# Patient Record
Sex: MALE | Race: WHITE | NOT HISPANIC OR LATINO | Employment: OTHER | URBAN - METROPOLITAN AREA
[De-identification: names, ages, dates, MRNs, and addresses within clinical notes are randomized per-mention and may not be internally consistent; named-entity substitution may affect disease eponyms.]

---

## 2023-02-10 ENCOUNTER — APPOINTMENT (OUTPATIENT)
Dept: RADIOLOGY | Facility: MEDICAL CENTER | Age: 68
DRG: 309 | End: 2023-02-10
Attending: EMERGENCY MEDICINE
Payer: MEDICARE

## 2023-02-10 ENCOUNTER — HOSPITAL ENCOUNTER (INPATIENT)
Facility: MEDICAL CENTER | Age: 68
LOS: 1 days | DRG: 309 | End: 2023-02-11
Attending: EMERGENCY MEDICINE | Admitting: STUDENT IN AN ORGANIZED HEALTH CARE EDUCATION/TRAINING PROGRAM
Payer: MEDICARE

## 2023-02-10 ENCOUNTER — APPOINTMENT (OUTPATIENT)
Dept: RADIOLOGY | Facility: MEDICAL CENTER | Age: 68
DRG: 309 | End: 2023-02-10
Payer: MEDICARE

## 2023-02-10 DIAGNOSIS — I48.91 ATRIAL FIBRILLATION, NEW ONSET (HCC): ICD-10-CM

## 2023-02-10 LAB
ALBUMIN SERPL BCP-MCNC: 4.4 G/DL (ref 3.2–4.9)
ALBUMIN/GLOB SERPL: 1.4 G/DL
ALP SERPL-CCNC: 93 U/L (ref 30–99)
ALT SERPL-CCNC: 26 U/L (ref 2–50)
ANION GAP SERPL CALC-SCNC: 14 MMOL/L (ref 7–16)
AST SERPL-CCNC: 18 U/L (ref 12–45)
BASOPHILS # BLD AUTO: 0.6 % (ref 0–1.8)
BASOPHILS # BLD: 0.07 K/UL (ref 0–0.12)
BILIRUB SERPL-MCNC: 0.5 MG/DL (ref 0.1–1.5)
BUN SERPL-MCNC: 29 MG/DL (ref 8–22)
CALCIUM ALBUM COR SERPL-MCNC: 9.3 MG/DL (ref 8.5–10.5)
CALCIUM SERPL-MCNC: 9.6 MG/DL (ref 8.5–10.5)
CHLORIDE SERPL-SCNC: 102 MMOL/L (ref 96–112)
CO2 SERPL-SCNC: 20 MMOL/L (ref 20–33)
CREAT SERPL-MCNC: 1.59 MG/DL (ref 0.5–1.4)
EKG IMPRESSION: NORMAL
EOSINOPHIL # BLD AUTO: 0.14 K/UL (ref 0–0.51)
EOSINOPHIL NFR BLD: 1.3 % (ref 0–6.9)
ERYTHROCYTE [DISTWIDTH] IN BLOOD BY AUTOMATED COUNT: 41.2 FL (ref 35.9–50)
GFR SERPLBLD CREATININE-BSD FMLA CKD-EPI: 47 ML/MIN/1.73 M 2
GLOBULIN SER CALC-MCNC: 3.1 G/DL (ref 1.9–3.5)
GLUCOSE BLD STRIP.AUTO-MCNC: 219 MG/DL (ref 65–99)
GLUCOSE SERPL-MCNC: 265 MG/DL (ref 65–99)
HCT VFR BLD AUTO: 47.4 % (ref 42–52)
HGB BLD-MCNC: 16.8 G/DL (ref 14–18)
IMM GRANULOCYTES # BLD AUTO: 0.06 K/UL (ref 0–0.11)
IMM GRANULOCYTES NFR BLD AUTO: 0.5 % (ref 0–0.9)
LYMPHOCYTES # BLD AUTO: 2.49 K/UL (ref 1–4.8)
LYMPHOCYTES NFR BLD: 22.4 % (ref 22–41)
MCH RBC QN AUTO: 31.5 PG (ref 27–33)
MCHC RBC AUTO-ENTMCNC: 35.4 G/DL (ref 33.7–35.3)
MCV RBC AUTO: 88.8 FL (ref 81.4–97.8)
MONOCYTES # BLD AUTO: 1.17 K/UL (ref 0–0.85)
MONOCYTES NFR BLD AUTO: 10.5 % (ref 0–13.4)
NEUTROPHILS # BLD AUTO: 7.17 K/UL (ref 1.82–7.42)
NEUTROPHILS NFR BLD: 64.7 % (ref 44–72)
NRBC # BLD AUTO: 0 K/UL
NRBC BLD-RTO: 0 /100 WBC
NT-PROBNP SERPL IA-MCNC: 1493 PG/ML (ref 0–125)
PLATELET # BLD AUTO: 296 K/UL (ref 164–446)
PMV BLD AUTO: 11.8 FL (ref 9–12.9)
POTASSIUM SERPL-SCNC: 3.7 MMOL/L (ref 3.6–5.5)
PROT SERPL-MCNC: 7.5 G/DL (ref 6–8.2)
RBC # BLD AUTO: 5.34 M/UL (ref 4.7–6.1)
SODIUM SERPL-SCNC: 136 MMOL/L (ref 135–145)
TROPONIN T SERPL-MCNC: 26 NG/L (ref 6–19)
TROPONIN T SERPL-MCNC: 26 NG/L (ref 6–19)
TSH SERPL DL<=0.005 MIU/L-ACNC: 1.24 UIU/ML (ref 0.38–5.33)
WBC # BLD AUTO: 11.1 K/UL (ref 4.8–10.8)

## 2023-02-10 PROCEDURE — 36415 COLL VENOUS BLD VENIPUNCTURE: CPT

## 2023-02-10 PROCEDURE — 84484 ASSAY OF TROPONIN QUANT: CPT | Mod: 91

## 2023-02-10 PROCEDURE — 93005 ELECTROCARDIOGRAM TRACING: CPT | Performed by: EMERGENCY MEDICINE

## 2023-02-10 PROCEDURE — 82962 GLUCOSE BLOOD TEST: CPT

## 2023-02-10 PROCEDURE — 700102 HCHG RX REV CODE 250 W/ 637 OVERRIDE(OP): Performed by: EMERGENCY MEDICINE

## 2023-02-10 PROCEDURE — 99223 1ST HOSP IP/OBS HIGH 75: CPT | Mod: AI | Performed by: STUDENT IN AN ORGANIZED HEALTH CARE EDUCATION/TRAINING PROGRAM

## 2023-02-10 PROCEDURE — 92960 CARDIOVERSION ELECTRIC EXT: CPT

## 2023-02-10 PROCEDURE — 700111 HCHG RX REV CODE 636 W/ 250 OVERRIDE (IP): Performed by: EMERGENCY MEDICINE

## 2023-02-10 PROCEDURE — 93005 ELECTROCARDIOGRAM TRACING: CPT

## 2023-02-10 PROCEDURE — A9270 NON-COVERED ITEM OR SERVICE: HCPCS | Performed by: EMERGENCY MEDICINE

## 2023-02-10 PROCEDURE — 84443 ASSAY THYROID STIM HORMONE: CPT

## 2023-02-10 PROCEDURE — 83036 HEMOGLOBIN GLYCOSYLATED A1C: CPT

## 2023-02-10 PROCEDURE — 71045 X-RAY EXAM CHEST 1 VIEW: CPT

## 2023-02-10 PROCEDURE — 85025 COMPLETE CBC W/AUTO DIFF WBC: CPT

## 2023-02-10 PROCEDURE — 5A2204Z RESTORATION OF CARDIAC RHYTHM, SINGLE: ICD-10-PCS | Performed by: EMERGENCY MEDICINE

## 2023-02-10 PROCEDURE — 80053 COMPREHEN METABOLIC PANEL: CPT

## 2023-02-10 PROCEDURE — 99152 MOD SED SAME PHYS/QHP 5/>YRS: CPT

## 2023-02-10 PROCEDURE — 700102 HCHG RX REV CODE 250 W/ 637 OVERRIDE(OP): Performed by: STUDENT IN AN ORGANIZED HEALTH CARE EDUCATION/TRAINING PROGRAM

## 2023-02-10 PROCEDURE — 770020 HCHG ROOM/CARE - TELE (206)

## 2023-02-10 PROCEDURE — 83880 ASSAY OF NATRIURETIC PEPTIDE: CPT

## 2023-02-10 PROCEDURE — A9270 NON-COVERED ITEM OR SERVICE: HCPCS | Performed by: STUDENT IN AN ORGANIZED HEALTH CARE EDUCATION/TRAINING PROGRAM

## 2023-02-10 PROCEDURE — 99285 EMERGENCY DEPT VISIT HI MDM: CPT

## 2023-02-10 RX ORDER — METOPROLOL TARTRATE 1 MG/ML
5 INJECTION, SOLUTION INTRAVENOUS
Status: DISCONTINUED | OUTPATIENT
Start: 2023-02-10 | End: 2023-02-11 | Stop reason: HOSPADM

## 2023-02-10 RX ORDER — LABETALOL HYDROCHLORIDE 5 MG/ML
10 INJECTION, SOLUTION INTRAVENOUS EVERY 4 HOURS PRN
Status: DISCONTINUED | OUTPATIENT
Start: 2023-02-10 | End: 2023-02-11 | Stop reason: HOSPADM

## 2023-02-10 RX ORDER — ACETAMINOPHEN 325 MG/1
650 TABLET ORAL EVERY 6 HOURS PRN
Status: DISCONTINUED | OUTPATIENT
Start: 2023-02-10 | End: 2023-02-11 | Stop reason: HOSPADM

## 2023-02-10 RX ORDER — BISACODYL 10 MG
10 SUPPOSITORY, RECTAL RECTAL
Status: DISCONTINUED | OUTPATIENT
Start: 2023-02-10 | End: 2023-02-11 | Stop reason: HOSPADM

## 2023-02-10 RX ORDER — ETOMIDATE 2 MG/ML
4 INJECTION INTRAVENOUS ONCE
Status: COMPLETED | OUTPATIENT
Start: 2023-02-10 | End: 2023-02-10

## 2023-02-10 RX ORDER — ETOMIDATE 2 MG/ML
0.15 INJECTION INTRAVENOUS ONCE
Status: COMPLETED | OUTPATIENT
Start: 2023-02-10 | End: 2023-02-10

## 2023-02-10 RX ORDER — POLYETHYLENE GLYCOL 3350 17 G/17G
1 POWDER, FOR SOLUTION ORAL
Status: DISCONTINUED | OUTPATIENT
Start: 2023-02-10 | End: 2023-02-11 | Stop reason: HOSPADM

## 2023-02-10 RX ORDER — ASPIRIN 81 MG/1
81 TABLET, CHEWABLE ORAL DAILY
Status: DISCONTINUED | OUTPATIENT
Start: 2023-02-11 | End: 2023-02-11 | Stop reason: HOSPADM

## 2023-02-10 RX ORDER — SIMVASTATIN 40 MG
40 TABLET ORAL EVERY EVENING
Status: DISCONTINUED | OUTPATIENT
Start: 2023-02-10 | End: 2023-02-11 | Stop reason: HOSPADM

## 2023-02-10 RX ORDER — AMIODARONE HYDROCHLORIDE 200 MG/1
400 TABLET ORAL TWICE DAILY
Status: DISCONTINUED | OUTPATIENT
Start: 2023-02-10 | End: 2023-02-11 | Stop reason: HOSPADM

## 2023-02-10 RX ORDER — AMOXICILLIN 250 MG
2 CAPSULE ORAL 2 TIMES DAILY
Status: DISCONTINUED | OUTPATIENT
Start: 2023-02-10 | End: 2023-02-11 | Stop reason: HOSPADM

## 2023-02-10 RX ADMIN — INSULIN HUMAN 2 UNITS: 100 INJECTION, SOLUTION PARENTERAL at 21:25

## 2023-02-10 RX ADMIN — ETOMIDATE 4 MG: 2 INJECTION INTRAVENOUS at 18:48

## 2023-02-10 RX ADMIN — AMIODARONE HYDROCHLORIDE 400 MG: 200 TABLET ORAL at 21:20

## 2023-02-10 RX ADMIN — APIXABAN 5 MG: 5 TABLET, FILM COATED ORAL at 21:20

## 2023-02-10 RX ADMIN — SIMVASTATIN 40 MG: 40 TABLET, FILM COATED ORAL at 21:20

## 2023-02-10 RX ADMIN — ETOMIDATE 13 MG: 2 INJECTION INTRAVENOUS at 18:45

## 2023-02-10 ASSESSMENT — CHA2DS2 SCORE
VASCULAR DISEASE: YES
SEX: MALE
HYPERTENSION: NO
PRIOR STROKE OR TIA OR THROMBOEMBOLISM: NO
CHA2DS2 VASC SCORE: 2
CHF OR LEFT VENTRICULAR DYSFUNCTION: NO
AGE 75 OR GREATER: NO
DIABETES: NO
AGE 65 TO 74: YES

## 2023-02-10 ASSESSMENT — LIFESTYLE VARIABLES
TOTAL SCORE: 0
EVER HAD A DRINK FIRST THING IN THE MORNING TO STEADY YOUR NERVES TO GET RID OF A HANGOVER: NO
HOW MANY TIMES IN THE PAST YEAR HAVE YOU HAD 5 OR MORE DRINKS IN A DAY: 0
DOES PATIENT WANT TO STOP DRINKING: NO
TOTAL SCORE: 0
AVERAGE NUMBER OF DAYS PER WEEK YOU HAVE A DRINK CONTAINING ALCOHOL: 0
ALCOHOL_USE: NO
HAVE PEOPLE ANNOYED YOU BY CRITICIZING YOUR DRINKING: NO
EVER FELT BAD OR GUILTY ABOUT YOUR DRINKING: NO
CONSUMPTION TOTAL: NEGATIVE
HAVE YOU EVER FELT YOU SHOULD CUT DOWN ON YOUR DRINKING: NO
TOTAL SCORE: 0
ON A TYPICAL DAY WHEN YOU DRINK ALCOHOL HOW MANY DRINKS DO YOU HAVE: 0

## 2023-02-10 ASSESSMENT — ENCOUNTER SYMPTOMS
PALPITATIONS: 1
SORE THROAT: 0
SHORTNESS OF BREATH: 0
COUGH: 0
NAUSEA: 0
VOMITING: 0
ABDOMINAL PAIN: 0
FEVER: 0
CHILLS: 0
HEADACHES: 0
DIARRHEA: 0
DIZZINESS: 1

## 2023-02-10 ASSESSMENT — COGNITIVE AND FUNCTIONAL STATUS - GENERAL
DAILY ACTIVITIY SCORE: 24
SUGGESTED CMS G CODE MODIFIER MOBILITY: CH
SUGGESTED CMS G CODE MODIFIER DAILY ACTIVITY: CH
MOBILITY SCORE: 24

## 2023-02-10 ASSESSMENT — PATIENT HEALTH QUESTIONNAIRE - PHQ9
SUM OF ALL RESPONSES TO PHQ9 QUESTIONS 1 AND 2: 0
2. FEELING DOWN, DEPRESSED, IRRITABLE, OR HOPELESS: NOT AT ALL
1. LITTLE INTEREST OR PLEASURE IN DOING THINGS: NOT AT ALL

## 2023-02-10 ASSESSMENT — FIBROSIS 4 INDEX: FIB4 SCORE: 0.81

## 2023-02-10 ASSESSMENT — PAIN DESCRIPTION - PAIN TYPE: TYPE: ACUTE PAIN

## 2023-02-10 NOTE — ED TRIAGE NOTES
Vitals:    02/10/23 1437   BP: 118/70   Pulse: (!) 57   Resp: 16   Temp: 36.7 °C (98.1 °F)   SpO2: 96%     Chief Complaint   Patient presents with    Chest Pain    Dizziness     Pt reports the above for several days. He has had a heart attack about 10 years ago with stent placement.     Pt is ambulatory to and from triage and is alert and oriented x 4.

## 2023-02-11 ENCOUNTER — TELEPHONE (OUTPATIENT)
Dept: CARDIOLOGY | Facility: MEDICAL CENTER | Age: 68
End: 2023-02-11

## 2023-02-11 ENCOUNTER — APPOINTMENT (OUTPATIENT)
Dept: CARDIOLOGY | Facility: MEDICAL CENTER | Age: 68
DRG: 309 | End: 2023-02-11
Attending: STUDENT IN AN ORGANIZED HEALTH CARE EDUCATION/TRAINING PROGRAM
Payer: MEDICARE

## 2023-02-11 ENCOUNTER — PHARMACY VISIT (OUTPATIENT)
Dept: PHARMACY | Facility: MEDICAL CENTER | Age: 68
End: 2023-02-11
Payer: COMMERCIAL

## 2023-02-11 VITALS
HEART RATE: 71 BPM | OXYGEN SATURATION: 94 % | RESPIRATION RATE: 17 BRPM | BODY MASS INDEX: 26.91 KG/M2 | TEMPERATURE: 98.1 F | HEIGHT: 71 IN | DIASTOLIC BLOOD PRESSURE: 84 MMHG | WEIGHT: 192.24 LBS | SYSTOLIC BLOOD PRESSURE: 125 MMHG

## 2023-02-11 DIAGNOSIS — I48.91 ATRIAL FIBRILLATION, NEW ONSET (HCC): ICD-10-CM

## 2023-02-11 DIAGNOSIS — I42.9 CARDIOMYOPATHY, UNSPECIFIED TYPE (HCC): ICD-10-CM

## 2023-02-11 DIAGNOSIS — I25.10 CORONARY ARTERY DISEASE INVOLVING NATIVE CORONARY ARTERY OF NATIVE HEART WITHOUT ANGINA PECTORIS: ICD-10-CM

## 2023-02-11 PROBLEM — N17.9 AKI (ACUTE KIDNEY INJURY) (HCC): Status: RESOLVED | Noted: 2023-02-11 | Resolved: 2023-02-11

## 2023-02-11 PROBLEM — N17.9 AKI (ACUTE KIDNEY INJURY) (HCC): Status: ACTIVE | Noted: 2023-02-11

## 2023-02-11 PROBLEM — I10 PRIMARY HYPERTENSION: Status: ACTIVE | Noted: 2023-02-11

## 2023-02-11 PROBLEM — E11.65 UNCONTROLLED TYPE 2 DIABETES MELLITUS WITH HYPERGLYCEMIA (HCC): Status: ACTIVE | Noted: 2023-02-11

## 2023-02-11 PROBLEM — E78.5 DYSLIPIDEMIA: Status: ACTIVE | Noted: 2023-02-11

## 2023-02-11 LAB
ANION GAP SERPL CALC-SCNC: 12 MMOL/L (ref 7–16)
APPEARANCE UR: CLEAR
BILIRUB UR QL STRIP.AUTO: NEGATIVE
BUN SERPL-MCNC: 30 MG/DL (ref 8–22)
CALCIUM SERPL-MCNC: 8.9 MG/DL (ref 8.5–10.5)
CHLORIDE SERPL-SCNC: 100 MMOL/L (ref 96–112)
CO2 SERPL-SCNC: 23 MMOL/L (ref 20–33)
COLOR UR: YELLOW
CREAT SERPL-MCNC: 1.28 MG/DL (ref 0.5–1.4)
GFR SERPLBLD CREATININE-BSD FMLA CKD-EPI: 61 ML/MIN/1.73 M 2
GLUCOSE BLD STRIP.AUTO-MCNC: 198 MG/DL (ref 65–99)
GLUCOSE BLD STRIP.AUTO-MCNC: 291 MG/DL (ref 65–99)
GLUCOSE SERPL-MCNC: 211 MG/DL (ref 65–99)
GLUCOSE UR STRIP.AUTO-MCNC: 250 MG/DL
KETONES UR STRIP.AUTO-MCNC: ABNORMAL MG/DL
LEUKOCYTE ESTERASE UR QL STRIP.AUTO: NEGATIVE
LV EJECT FRACT  99904: 50
LV EJECT FRACT MOD 4C 99902: 50.58
MICRO URNS: ABNORMAL
NITRITE UR QL STRIP.AUTO: NEGATIVE
PH UR STRIP.AUTO: 5 [PH] (ref 5–8)
POTASSIUM SERPL-SCNC: 3.4 MMOL/L (ref 3.6–5.5)
PROT UR QL STRIP: NEGATIVE MG/DL
RBC UR QL AUTO: NEGATIVE
SODIUM SERPL-SCNC: 135 MMOL/L (ref 135–145)
SP GR UR STRIP.AUTO: 1.03
UROBILINOGEN UR STRIP.AUTO-MCNC: 1 MG/DL

## 2023-02-11 PROCEDURE — 700102 HCHG RX REV CODE 250 W/ 637 OVERRIDE(OP): Performed by: EMERGENCY MEDICINE

## 2023-02-11 PROCEDURE — 99239 HOSP IP/OBS DSCHRG MGMT >30: CPT | Performed by: STUDENT IN AN ORGANIZED HEALTH CARE EDUCATION/TRAINING PROGRAM

## 2023-02-11 PROCEDURE — 82962 GLUCOSE BLOOD TEST: CPT | Mod: 91

## 2023-02-11 PROCEDURE — 93306 TTE W/DOPPLER COMPLETE: CPT | Mod: 26 | Performed by: INTERNAL MEDICINE

## 2023-02-11 PROCEDURE — A9270 NON-COVERED ITEM OR SERVICE: HCPCS | Performed by: EMERGENCY MEDICINE

## 2023-02-11 PROCEDURE — A9270 NON-COVERED ITEM OR SERVICE: HCPCS | Performed by: STUDENT IN AN ORGANIZED HEALTH CARE EDUCATION/TRAINING PROGRAM

## 2023-02-11 PROCEDURE — 700102 HCHG RX REV CODE 250 W/ 637 OVERRIDE(OP): Performed by: STUDENT IN AN ORGANIZED HEALTH CARE EDUCATION/TRAINING PROGRAM

## 2023-02-11 PROCEDURE — 93306 TTE W/DOPPLER COMPLETE: CPT

## 2023-02-11 PROCEDURE — RXMED WILLOW AMBULATORY MEDICATION CHARGE: Performed by: STUDENT IN AN ORGANIZED HEALTH CARE EDUCATION/TRAINING PROGRAM

## 2023-02-11 PROCEDURE — 81003 URINALYSIS AUTO W/O SCOPE: CPT

## 2023-02-11 PROCEDURE — 80048 BASIC METABOLIC PNL TOTAL CA: CPT

## 2023-02-11 PROCEDURE — 99222 1ST HOSP IP/OBS MODERATE 55: CPT | Performed by: INTERNAL MEDICINE

## 2023-02-11 RX ORDER — CARVEDILOL 3.12 MG/1
3.12 TABLET ORAL 2 TIMES DAILY
COMMUNITY
Start: 2023-01-17

## 2023-02-11 RX ORDER — POTASSIUM CHLORIDE 20 MEQ/1
20 TABLET, EXTENDED RELEASE ORAL ONCE
Status: COMPLETED | OUTPATIENT
Start: 2023-02-11 | End: 2023-02-11

## 2023-02-11 RX ORDER — HYDROCHLOROTHIAZIDE 25 MG/1
25 TABLET ORAL DAILY
Status: ON HOLD | COMMUNITY
Start: 2023-01-17 | End: 2023-03-31

## 2023-02-11 RX ORDER — LOSARTAN POTASSIUM 50 MG/1
100 TABLET ORAL
Status: DISCONTINUED | OUTPATIENT
Start: 2023-02-11 | End: 2023-02-11 | Stop reason: HOSPADM

## 2023-02-11 RX ORDER — AMIODARONE HYDROCHLORIDE 400 MG/1
400 TABLET ORAL 2 TIMES DAILY
Qty: 60 TABLET | Refills: 0 | Status: SHIPPED | OUTPATIENT
Start: 2023-02-11 | End: 2023-02-11 | Stop reason: SDUPTHER

## 2023-02-11 RX ORDER — CARVEDILOL 3.12 MG/1
3.12 TABLET ORAL 2 TIMES DAILY WITH MEALS
Status: DISCONTINUED | OUTPATIENT
Start: 2023-02-11 | End: 2023-02-11 | Stop reason: HOSPADM

## 2023-02-11 RX ORDER — SIMVASTATIN 80 MG
40 TABLET ORAL DAILY
Status: ON HOLD | COMMUNITY
Start: 2023-01-17 | End: 2023-03-31

## 2023-02-11 RX ORDER — LOSARTAN POTASSIUM 100 MG/1
100 TABLET ORAL DAILY
COMMUNITY

## 2023-02-11 RX ORDER — AMIODARONE HYDROCHLORIDE 200 MG/1
400 TABLET ORAL 2 TIMES DAILY
Qty: 120 TABLET | Refills: 0 | Status: ON HOLD | OUTPATIENT
Start: 2023-02-11 | End: 2023-03-28 | Stop reason: SDUPTHER

## 2023-02-11 RX ADMIN — POTASSIUM CHLORIDE 20 MEQ: 1500 TABLET, EXTENDED RELEASE ORAL at 08:32

## 2023-02-11 RX ADMIN — LOSARTAN POTASSIUM 100 MG: 50 TABLET, FILM COATED ORAL at 05:19

## 2023-02-11 RX ADMIN — CARVEDILOL 3.12 MG: 3.12 TABLET, FILM COATED ORAL at 08:03

## 2023-02-11 RX ADMIN — ASPIRIN 81 MG: 81 TABLET, CHEWABLE ORAL at 05:19

## 2023-02-11 RX ADMIN — AMIODARONE HYDROCHLORIDE 400 MG: 200 TABLET ORAL at 05:19

## 2023-02-11 RX ADMIN — APIXABAN 5 MG: 5 TABLET, FILM COATED ORAL at 05:19

## 2023-02-11 RX ADMIN — INSULIN HUMAN 1 UNITS: 100 INJECTION, SOLUTION PARENTERAL at 08:06

## 2023-02-11 RX ADMIN — INSULIN HUMAN 3 UNITS: 100 INJECTION, SOLUTION PARENTERAL at 11:39

## 2023-02-11 ASSESSMENT — PAIN DESCRIPTION - PAIN TYPE: TYPE: ACUTE PAIN

## 2023-02-11 NOTE — ED NOTES
Pt to yellow 63.  Pt reports increase in chest pain and reports dizziness.  Pt palced on monitor and -160 a fib.  Ordering another EKG.   ERP to see.

## 2023-02-11 NOTE — ASSESSMENT & PLAN NOTE
Patient says meds were stopped, however per care everywhere saxagliptin removal 12/2022.  A1c 09/2022 was 6.9  In the ED glucose levels 265.  Will need to be on medication, check A1c  Sliding scale insulin while in the hospital

## 2023-02-11 NOTE — ED NOTES
1844: Time out completed. ERP at bedside for cardioversion. RT and 2 RN at bedside.   1845: Medication given per MAR  1846: Synchronized cardioversion 100 J  1848: Pt back into rapid Afib 160's, medicated per MAR.   1850: Synchronized cardioversion 100 J  1902: Pt awake and alert. Sinus rhythm 80's, having short bursts of Afib. Rate controlled.

## 2023-02-11 NOTE — CONSULTS
Reason for Consult:  Asked by Dr Axel Bautista and Lalo Tuttle D.O. to see this patient with atrial fibrillation  Patient's PCP: Jalil Haines M.D.    CC:   Chief Complaint   Patient presents with    Chest Pain    Dizziness       HPI: This is a 68-year-old gentleman with a history of coronary disease status post stenting to his LAD in 2010 he follows up with Dr. Sinha at [facility] he presents with chest pains dizziness and was found to have A-fib with RVR atrial flutter initially he underwent cardioversion which was eventually successful and restart him on anticoagulation and antiarrhythmic with amiodarone.  He has diet-controlled diabetes and has been compliant with his regular medicines and regular follow-up.  He does not recall having similar symptoms.  Overnight he has not had any further atrial fibrillation we did an echocardiogram given his elevated BNP and indeterminate troponin and his EF was 50% with no distinct wall motion abnormalities although the study is technically limited    Medications / Drug list prior to admission:  No current facility-administered medications on file prior to encounter.     Current Outpatient Medications on File Prior to Encounter   Medication Sig Dispense Refill    Simvastatin (ZOCOR PO) Take 1 Tablet by mouth every day.      aspirin EC (ECOTRIN) 81 MG Tablet Delayed Response Take 81 mg by mouth every day.         Current list of administered Medications:    Current Facility-Administered Medications:     losartan (COZAAR) tablet 100 mg, 100 mg, Oral, Q DAY, Paola Atkins M.D., 100 mg at 02/11/23 0519    carvedilol (COREG) tablet 3.125 mg, 3.125 mg, Oral, BID WITH MEALS, Paola Atkins M.D., 3.125 mg at 02/11/23 0803    senna-docusate (PERICOLACE or SENOKOT S) 8.6-50 MG per tablet 2 Tablet, 2 Tablet, Oral, BID **AND** polyethylene glycol/lytes (MIRALAX) PACKET 1 Packet, 1 Packet, Oral, QDAY PRN **AND** magnesium hydroxide (MILK OF MAGNESIA) suspension 30 mL, 30  mL, Oral, QDAY PRN **AND** bisacodyl (DULCOLAX) suppository 10 mg, 10 mg, Rectal, QDAY PRN, Paola Atkins M.D.    apixaban (ELIQUIS) tablet 5 mg, 5 mg, Oral, BID, Paola Atkins M.D., 5 mg at 02/11/23 0519    acetaminophen (Tylenol) tablet 650 mg, 650 mg, Oral, Q6HRS PRN, Paola Atkins M.D.    labetalol (NORMODYNE/TRANDATE) injection 10 mg, 10 mg, Intravenous, Q4HRS PRN, Paola Atkins M.D.    Metoprolol Tartrate (LOPRESSOR) injection 5 mg, 5 mg, Intravenous, Q5 MIN PRN, Paola Atkins M.D.    insulin regular (HumuLIN R,NovoLIN R) injection, 1-6 Units, Subcutaneous, 4X/DAY ACHS, 3 Units at 02/11/23 1139 **AND** POC blood glucose manual result, , , Q AC AND BEDTIME(S) **AND** NOTIFY MD and PharmD, , , Once **AND** Administer 20 grams of glucose (approximately 8 ounces of fruit juice) every 15 minutes PRN FSBG less than 70 mg/dL, , , PRN **AND** dextrose 10 % BOLUS 25 g, 25 g, Intravenous, Q15 MIN PRN, Paola Atkins M.D.    aspirin (ASA) chewable tab 81 mg, 81 mg, Oral, DAILY, Paola Atkins M.D., 81 mg at 02/11/23 0519    simvastatin (ZOCOR) tablet 40 mg, 40 mg, Oral, Q EVENING, Paola Atkins M.D., 40 mg at 02/10/23 2120    amiodarone (Cordarone) tablet 400 mg, 400 mg, Oral, TWICE DAILY, Axel Bautista M.D., 400 mg at 02/11/23 0519    Past Medical History:   Diagnosis Date   Diabetes mellitus   HTN (hypertension)     Past Surgical History:   Procedure Laterality Date   HX HERNIA REPAIR Bilateral   HX SURGICAL OTHER 2003   HERNIA REPAIR   HX SURGICAL OTHER 2006   CORONARY STENT   HX SURGICAL OTHER 2013   HERNIA REPAIR   [place] COLONOSCOPY W/BIOPSY SINGLE/MULTIPLE N/A 4/20/2021   COLONOSCOPY performed by Makenna Parnell MD at [facility] ENDO     Immunization History   Administered Date(s) Administered   TDAP Vaccine > 6 YO IM 02/04/2011, 10/21/2022     FAMILY MEDICAL HISTORY     Family History   Problem Relation Name Age of Onset   Other Father   Heart Problems   Stroke Mother   Other Sister   Obesity   Patient  "family history was personally reviewed, no pertinent family history to current presentation    Social History     Tobacco Use    Smoking status: Never    Smokeless tobacco: Never   Vaping Use    Vaping Use: Never used   Substance Use Topics    Alcohol use: Not Currently    Drug use: Yes     Types: Inhaled     Comment: marijuana       ALLERGIES:  No Known Allergies    Review of systems:  A complete review of symptoms was reviewed with patient. This is reviewed in H&P and PMH. ALL OTHERS reviewed and negative    Physical exam:  Patient Vitals for the past 24 hrs:   BP Temp Temp src Pulse Resp SpO2 Height Weight   02/11/23 0802 125/73 36.7 °C (98.1 °F) Temporal 81 17 94 % -- --   02/11/23 0427 127/81 36.6 °C (97.9 °F) Temporal 80 16 93 % -- --   02/10/23 2354 108/74 37.3 °C (99.1 °F) Temporal 82 16 91 % -- --   02/10/23 2106 (!) 146/90 37 °C (98.6 °F) Temporal 91 15 94 % 1.803 m (5' 11\") 87.2 kg (192 lb 3.9 oz)   02/10/23 2000 118/77 -- -- 83 18 95 % -- --   02/10/23 1914 -- -- -- 87 18 95 % -- --   02/10/23 1909 -- -- -- 88 17 95 % -- --   02/10/23 1904 122/80 -- -- 91 (!) 25 96 % -- --   02/10/23 1859 -- -- -- 89 15 96 % -- --   02/10/23 1854 -- -- -- 89 (!) 8 95 % -- --   02/10/23 1849 -- -- -- 96 12 93 % -- --   02/10/23 1844 -- -- -- (!) 155 (!) 22 96 % -- --   02/10/23 1801 114/87 -- -- (!) 157 17 95 % -- --   02/10/23 1746 121/86 -- -- (!) 152 20 96 % -- --   02/10/23 1645 94/67 -- -- 100 18 96 % -- --   02/10/23 1500 -- -- -- -- -- -- 1.803 m (5' 11\") 87 kg (191 lb 12.8 oz)   02/10/23 1437 118/70 36.7 °C (98.1 °F) Temporal (!) 57 16 96 % -- --     General: No acute distress.   EYES: no jaundice  HEENT: OP clear   Neck:  No JVD.   CVS:   RRR.   Resp: Normal respiratory effort,   Abdomen: ND,  Skin: Grossly nothing acute no obvious rashes  Neurological: Alert, Moves all extremities  Extremities:   no edema. No cyanosis.       Data:  Laboratory studies personally reviewed by me:  Recent Results (from the past 24 " hour(s))   EKG    Collection Time: 02/10/23  2:54 PM   Result Value Ref Range    Report       [facility] Emergency Dept[facility]    Test Date:  2023-02-10  Pt Name:    NIMO KIRBY              Department: ER  MRN:        [id number]                      Room:  Gender:     Male                         Technician: [id number]  :        1955                   Requested By:ER TRIAGE PROTOCOL  Order #:    [id number]                    Reading MD:    Measurements  Intervals                                Axis  Rate:       102                          P:          77  VA:         128                          QRS:        -78  QRSD:       94                           T:          81  QT:         323  QTc:        421    Interpretive Statements  Sinus tachycardia  Consider right atrial enlargement  Left anterior fascicular block  No previous ECG available for comparison     CBC with Differential    Collection Time: 02/10/23  3:11 PM   Result Value Ref Range    WBC 11.1 (H) 4.8 - 10.8 K/uL    RBC 5.34 4.70 - 6.10 M/uL    Hemoglobin 16.8 14.0 - 18.0 g/dL    Hematocrit 47.4 42.0 - 52.0 %    MCV 88.8 81.4 - 97.8 fL    MCH 31.5 27.0 - 33.0 pg    MCHC 35.4 (H) 33.7 - 35.3 g/dL    RDW 41.2 35.9 - 50.0 fL    Platelet Count 296 164 - 446 K/uL    MPV 11.8 9.0 - 12.9 fL    Neutrophils-Polys 64.70 44.00 - 72.00 %    Lymphocytes 22.40 22.00 - 41.00 %    Monocytes 10.50 0.00 - 13.40 %    Eosinophils 1.30 0.00 - 6.90 %    Basophils 0.60 0.00 - 1.80 %    Immature Granulocytes 0.50 0.00 - 0.90 %    Nucleated RBC 0.00 /100 WBC    Neutrophils (Absolute) 7.17 1.82 - 7.42 K/uL    Lymphs (Absolute) 2.49 1.00 - 4.80 K/uL    Monos (Absolute) 1.17 (H) 0.00 - 0.85 K/uL    Eos (Absolute) 0.14 0.00 - 0.51 K/uL    Baso (Absolute) 0.07 0.00 - 0.12 K/uL    Immature Granulocytes (abs) 0.06 0.00 - 0.11 K/uL    NRBC (Absolute) 0.00 K/uL   Complete Metabolic Panel (CMP)    Collection Time: 02/10/23  3:11 PM   Result Value Ref Range    Sodium 136 135  - 145 mmol/L    Potassium 3.7 3.6 - 5.5 mmol/L    Chloride 102 96 - 112 mmol/L    Co2 20 20 - 33 mmol/L    Anion Gap 14.0 7.0 - 16.0    Glucose 265 (H) 65 - 99 mg/dL    Bun 29 (H) 8 - 22 mg/dL    Creatinine 1.59 (H) 0.50 - 1.40 mg/dL    Calcium 9.6 8.5 - 10.5 mg/dL    AST(SGOT) 18 12 - 45 U/L    ALT(SGPT) 26 2 - 50 U/L    Alkaline Phosphatase 93 30 - 99 U/L    Total Bilirubin 0.5 0.1 - 1.5 mg/dL    Albumin 4.4 3.2 - 4.9 g/dL    Total Protein 7.5 6.0 - 8.2 g/dL    Globulin 3.1 1.9 - 3.5 g/dL    A-G Ratio 1.4 g/dL   Troponins NOW    Collection Time: 02/10/23  3:11 PM   Result Value Ref Range    Troponin T 26 (H) 6 - 19 ng/L   CORRECTED CALCIUM    Collection Time: 02/10/23  3:11 PM   Result Value Ref Range    Correct Calcium 9.3 8.5 - 10.5 mg/dL   ESTIMATED GFR    Collection Time: 02/10/23  3:11 PM   Result Value Ref Range    GFR (CKD-EPI) 47 (A) >60 mL/min/1.73 m 2   TSH WITH REFLEX TO FT4    Collection Time: 02/10/23  3:11 PM   Result Value Ref Range    TSH 1.240 0.380 - 5.330 uIU/mL   EKG    Collection Time: 02/10/23  5:46 PM   Result Value Ref Range    Report       [facility] Emergency Dept[facility]    Test Date:  2023-02-10  Pt Name:    NIMO KIRBY              Department: ER  MRN:        [id number]                      Room:  Gender:     Male                         Technician: [id number]  :        1955                   Requested By:ER TRIAGE PROTOCOL  Order #:    [id number]                    Reading MD: Michele Reyna MD    Measurements  Intervals                                Axis  Rate:       152                          P:          249  NM:         75                           QRS:        -41  QRSD:       97                           T:          74  QT:         330  QTc:        525    Interpretive Statements  EKG is atrial fibrillation with RVR  Electronically Signed On 2- 19:09:39 PST by Michele Reyna MD     Troponins in two (2) hours    Collection Time: 02/10/23  5:55 PM   Result  Value Ref Range    Troponin T 26 (H) 6 - 19 ng/L   proBrain Natriuretic Peptide, NT    Collection Time: 02/10/23  5:55 PM   Result Value Ref Range    NT-proBNP 1493 (H) 0 - 125 pg/mL   EKG    Collection Time: 02/10/23  6:48 PM   Result Value Ref Range    Report       [facility] Emergency Dept.    Test Date:  2023-02-10  Pt Name:    NIMO KIRBY              Department: ER  MRN:        [id number]                      Room:       [facility]  Gender:     Male                         Technician: [id number]  :        1955                   Requested By:ER TRIAGE PROTOCOL  Order #:    [id number]                    Reading MD: Michele Reyna MD    Measurements  Intervals                                Axis  Rate:       92                           P:          46  MT:         131                          QRS:        -42  QRSD:       102                          T:          62  QT:         360  QTc:        446    Interpretive Statements  And check EKG sinus rhythm normal axis mildly prolonged QRS consistent with  anterior fascicular block no ST changes consistent with acute regional  ischemia  Electronically Signed On 2- 19:09:21 PST by Michele Reyna MD     POCT glucose device results    Collection Time: 02/10/23  9:24 PM   Result Value Ref Range    POC Glucose, Blood 219 (H) 65 - 99 mg/dL   Basic Metabolic Panel (BMP)    Collection Time: 23  1:54 AM   Result Value Ref Range    Sodium 135 135 - 145 mmol/L    Potassium 3.4 (L) 3.6 - 5.5 mmol/L    Chloride 100 96 - 112 mmol/L    Co2 23 20 - 33 mmol/L    Glucose 211 (H) 65 - 99 mg/dL    Bun 30 (H) 8 - 22 mg/dL    Creatinine 1.28 0.50 - 1.40 mg/dL    Calcium 8.9 8.5 - 10.5 mg/dL    Anion Gap 12.0 7.0 - 16.0   ESTIMATED GFR    Collection Time: 23  1:54 AM   Result Value Ref Range    GFR (CKD-EPI) 61 >60 mL/min/1.73 m 2   URINALYSIS    Collection Time: 23  2:15 AM    Specimen: Urine   Result Value Ref Range    Color Yellow     Character  Clear     Specific Gravity 1.026 <1.035    Ph 5.0 5.0 - 8.0    Glucose 250 (A) Negative mg/dL    Ketones Trace (A) Negative mg/dL    Protein Negative Negative mg/dL    Bilirubin Negative Negative    Urobilinogen, Urine 1.0 Negative    Nitrite Negative Negative    Leukocyte Esterase Negative Negative    Occult Blood Negative Negative    Micro Urine Req see below    POCT glucose device results    Collection Time: 02/11/23  8:05 AM   Result Value Ref Range    POC Glucose, Blood 198 (H) 65 - 99 mg/dL       Imaging:  DX-CHEST-PORTABLE (1 VIEW)   Final Result      1.  There is no acute cardiopulmonary process.      EC-ECHOCARDIOGRAM COMPLETE W/O CONT    (Results Pending)           EKG tracings personally reviewed by me  [atrial flutter then sinus rhythm    Echocardiogram images personally reviewed by me show EF 50% the LAD distribution looks to have normal perfusion but the 2 chamber views are very limited      All pertinent features of laboratory and imaging reviewed including primary images where applicable      Principal Problem:    Atrial fibrillation, new onset (HCC) POA: Yes  Active Problems:    Coronary artery disease involving native coronary artery of native heart without angina pectoris POA: Yes    Primary hypertension POA: Yes    Dyslipidemia POA: Yes    Uncontrolled type 2 diabetes mellitus with hyperglycemia (HCC) POA: Yes    HEIDY (acute kidney injury) (HCC) POA: Yes  Resolved Problems:    * No resolved hospital problems. *      Assessment / Plan:  New onset atrial fibrillation  Amnio loading is reasonable 400 mg twice daily for 2 weeks then 400 mg daily until he can be seen  Hanna  We addressed the management of chronic anticoagulation at today's visit. He understands the risks and benefits of chronic anticoagulation.  We reviewed and verified the results of annual testing for anemia and kidney function.    I will put a referral into our EP program to discuss antiarrhythmics versus procedural approach for  A-fib    Given his history of coronary disease and is mildly reduced ejection fraction we will arrange for an outpatient stress test, given the need for uninterrupted anticoagulation he would not be a good candidate for angiogram if that were abnormal for at least a month    He can be safely discharged today with close follow-up arranged    I personally discussed his case with  Dr Lalo Tuttle D.O. and Dr. Axel Bautista        It is my pleasure to participate in the care of Mr. Wilkinson.  Please do not hesitate to contact me with questions or concerns.    James Bentley MD PhD [facility]  Cardiologist [facility]    2/11/2023    Please note that this dictation was created using voice recognition software. There may be errors I did not discover before finalizing the note.

## 2023-02-11 NOTE — PROGRESS NOTES
IV dc'd.  Discharge instructions given to patient; patient verbalizes understanding, all questions answered.  Copy of DC summary provided, signed copy in chart.  2 prescriptions electronically sent to pt's pharmacy/provided to patient, copies in chart.  Pt states personal belongings are in possession.  Pt escorted off unit by this RN without incident.

## 2023-02-11 NOTE — H&P
Hospital Medicine History & Physical Note    Date of Service  2/10/2023    Primary Care Physician  Jalil Haines M.D.    Consultants  cardiology    Specialist Names: Dr. Bentley    Code Status  Full Code    Chief Complaint  Chief Complaint   Patient presents with    Chest Pain    Dizziness       History of Presenting Illness  Zac Wilkinson is a 68 y.o. male who presented 2/10/2023 with chest pain, palpitations, dizziness.  PMH of CAD s/p stent 10 years ago, dyslipidemia, HTN, DM2.  Symptoms have been going on over the past 4-5 days and progressively getting worse, sedation was started with palpitations.  Decided to come in because now he is even having dyspnea on exertion.  History of CAD s/p stent 10 years ago, but says this feels different than when he had his MI.  Denies fever/chills or any other acute complaints.  Compliant with his medication, but is not sure what he is on.  Says he was taken off his diabetic medications about a year ago after significant improvement in his lab values and he stopped drinking beer.    In the ED while on telemetry monitor, patient went into rapid tachycardia, EDP had patient Valsalva which slowed his rate down and and was irregular, appeared to be atrial fibrillation.  EDP discussed with cardiology who agreed with cardioversion.  After patient was cardioverted he went to sinus rhythm briefly but then went back into atrial fibrillation with RVR.  He was cardioverted again and again went back into sinus rhythm for a brief period of time, he has been in and out of atrial fibrillation since then but not in RVR.  No history of arrhythmia.    In the ED on my evaluation afebrile, hemodynamically stable, in sinus rhythm.    I discussed the plan of care with patient, bedside RN, and EDP .    Review of Systems  Review of Systems   Constitutional:  Negative for chills and fever.   HENT:  Negative for sore throat.    Respiratory:  Negative for cough and shortness of breath.     Cardiovascular:  Positive for chest pain and palpitations.   Gastrointestinal:  Negative for abdominal pain, diarrhea, nausea and vomiting.   Genitourinary:  Negative for dysuria and urgency.   Neurological:  Positive for dizziness. Negative for headaches.   All other systems reviewed and are negative.    Past Medical History  CAD s/p stent 10 years ago, DM2, HTN, dyslipidemia    Surgical History   has no past surgical history on file.     Family History  family history is not pertinent  Family history reviewed with patient. There is no family history that is pertinent to the chief complaint.     Social History   reports that he has never smoked. He has never used smokeless tobacco. He reports that he does not currently use alcohol. He reports current drug use. Drug: Inhaled.    Allergies  No Known Allergies    Medications  None       Physical Exam  Temp:  [36.7 °C (98.1 °F)-37.3 °C (99.1 °F)] 37.3 °C (99.1 °F)  Pulse:  [] 82  Resp:  [8-25] 16  BP: ()/(67-90) 108/74  SpO2:  [91 %-96 %] 91 %  Blood Pressure : 122/80   Temperature: 36.7 °C (98.1 °F)   Pulse: 87   Respiration: 18   Pulse Oximetry: 95 %       Physical Exam  Constitutional:       Appearance: Normal appearance.   HENT:      Head: Normocephalic and atraumatic.      Mouth/Throat:      Mouth: Mucous membranes are moist.      Pharynx: Oropharynx is clear. No oropharyngeal exudate or posterior oropharyngeal erythema.   Eyes:      General: No scleral icterus.  Cardiovascular:      Rate and Rhythm: Normal rate and regular rhythm.      Pulses: Normal pulses.      Heart sounds: Normal heart sounds. No murmur heard.  Pulmonary:      Effort: Pulmonary effort is normal. No respiratory distress.      Breath sounds: Normal breath sounds. No wheezing.   Abdominal:      Palpations: Abdomen is soft.      Tenderness: There is no abdominal tenderness.   Musculoskeletal:         General: No swelling or tenderness. Normal range of motion.      Cervical back:  Normal range of motion.   Skin:     General: Skin is warm and dry.   Neurological:      General: No focal deficit present.      Mental Status: He is alert and oriented to person, place, and time. Mental status is at baseline.   Psychiatric:         Mood and Affect: Mood normal.       Laboratory:  Recent Labs     02/10/23  1511   WBC 11.1*   RBC 5.34   HEMOGLOBIN 16.8   HEMATOCRIT 47.4   MCV 88.8   MCH 31.5   MCHC 35.4*   RDW 41.2   PLATELETCT 296   MPV 11.8     Recent Labs     02/10/23  1511 02/11/23  0154   SODIUM 136 135   POTASSIUM 3.7 3.4*   CHLORIDE 102 100   CO2 20 23   GLUCOSE 265* 211*   BUN 29* 30*   CREATININE 1.59* 1.28   CALCIUM 9.6 8.9     Recent Labs     02/10/23  1511 02/11/23  0154   ALTSGPT 26  --    ASTSGOT 18  --    ALKPHOSPHAT 93  --    TBILIRUBIN 0.5  --    GLUCOSE 265* 211*         Recent Labs     02/10/23  1755   NTPROBNP 1493*         Recent Labs     02/10/23  1511 02/10/23  1755   TROPONINT 26* 26*       Imaging:  DX-CHEST-PORTABLE (1 VIEW)   Final Result      1.  There is no acute cardiopulmonary process.      EC-ECHOCARDIOGRAM COMPLETE W/O CONT    (Results Pending)       X-Ray:  I have personally reviewed the images and compared with prior images. and My impression is: No acute changes  EKG:  I have personally reviewed the images and compared with prior images. and My impression is: NSR    Assessment/Plan:  Justification for Admission Status  I anticipate this patient will require at least two midnights for appropriate medical management, necessitating inpatient admission because A-fib with RVR, cardioverted but continues to go back into A-fib in the ED.  May need to be started on antiarrhythmic and/or rate control medications.  He was also noted to be bradycardic , need to monitor for sick sinus syndrome      * Atrial fibrillation, new onset (HCC)- (present on admission)  Assessment & Plan  Cardioverted in the ED twice, no longer in RVR but reportedly was going in and out of A-fib  No  history of arrhythmias.  Has a history of CAD s/p stent 10 years ago  TSH normal  Echocardiogram ordered  Started on amiodarone per cardiology recommendations  [id number] of 3.  Discussed anticoagulation with patient and started on apixaban  Cardiology consult in the morning    Uncontrolled type 2 diabetes mellitus with hyperglycemia (HCC)- (present on admission)  Assessment & Plan  Patient says meds were stopped, however per care everywhere saxagliptin removal 12/2022.  A1c 09/2022 was 6.9  In the ED glucose levels 265.  Will need to be on medication, check A1c  Sliding scale insulin while in the hospital    Coronary artery disease involving native coronary artery of native heart without angina pectoris- (present on admission)  Assessment & Plan  S/p stent 10 years ago  Continue aspirin and statin    HEIDY (acute kidney injury) (HCC)- (present on admission)  Assessment & Plan  Creatinine 1.59 on presentation.  Baseline 1.1 per chart review unclear etiology, likely due to decreased cardiac perfusion.  Improving on repeat labs  Avoid nephrotoxic agents    Dyslipidemia- (present on admission)  Assessment & Plan  Continue statin    Primary hypertension- (present on admission)  Assessment & Plan  Continue losartan and carvedilol        VTE prophylaxis: therapeutic anticoagulation with apixaban

## 2023-02-11 NOTE — PROGRESS NOTES
Assumed care of patient, received bedside report from NOC RN. Patient is A&O X 4. Pain 0/10. Vital signs stable overnight, on RA. On tele monitor, SR 82. POC discussed with patient and he verbalized understanding. Call light within reach and fall precautions in place. Bed locked and in lowest position.

## 2023-02-11 NOTE — CARE PLAN
The patient is Stable - Low risk of patient condition declining or worsening    Shift Goals  Clinical Goals: monitor HR/rhythm  Patient Goals: rest  Family Goals: na    Progress made toward(s) clinical / shift goals:    Problem: Pain - Standard  Goal: Alleviation of pain or a reduction in pain to the patient’s comfort goal  Outcome: Progressing  Note: Patient denies chest pain or dizziness. HR and heart rhythm being monitored for Afib.        Patient is not progressing towards the following goals:

## 2023-02-11 NOTE — PROGRESS NOTES
Patient discharging home. Tele box removed and all belongings with patient. Patient going down to D/C lounge. Meds to beds will be delivered to him.

## 2023-02-11 NOTE — PROGRESS NOTES
4 Eyes Skin Assessment Completed by CLEO Yang and MAGO Buckley.    Head WDL  Ears WDL  Nose WDL  Mouth WDL  Neck WDL  Breast/Chest WDL  Shoulder Blades WDL  Spine WDL  (R) Arm/Elbow/Hand WDL  (L) Arm/Elbow/Hand WDL  Abdomen WDL  Groin WDL  Scrotum/Coccyx/Buttocks WDL  (R) Leg WDL  (L) Leg WDL  (R) Heel/Foot/Toe WDL  (L) Heel/Foot/Toe WDL          Devices In Places Tele Box and Pulse Ox      Interventions In Place Pillows    Possible Skin Injury No    Pictures Uploaded Into Epic N/A  Wound Consult Placed N/A  RN Wound Prevention Protocol Ordered No

## 2023-02-11 NOTE — ED NOTES
Med rec partial per patient at bedside.  Patient unsure of names of his medications aside from simvastatin. Unable to verify medications with patient's pharmacy, [facility] ([phone]), as this pharmacy is closed at this time; pharmacy will be open again tomorrow at 09:00.  Allergies reviewed with patient. NKDA.  Patient denies any outpatient antibiotic usage within the last 30 days.

## 2023-02-11 NOTE — ASSESSMENT & PLAN NOTE
Cardioverted in the ED twice, no longer in RVR but reportedly was going in and out of A-fib  No history of arrhythmias.  Has a history of CAD s/p stent 10 years ago  TSH normal  Echocardiogram ordered  Started on amiodarone per cardiology recommendations  [id number] of 3.  Discussed anticoagulation with patient and started on apixaban  Cardiology consult in the morning

## 2023-02-11 NOTE — DISCHARGE SUMMARY
Discharge Summary    CHIEF COMPLAINT ON ADMISSION  Chief Complaint   Patient presents with    Chest Pain    Dizziness       Reason for Admission  Dizziness; Chest Pain     Admission Date  2/10/2023    CODE STATUS  Full Code    HPI & HOSPITAL COURSE  Zac Wilkinson is a 68 y.o. male who presented 2/10/2023 with chest pain, palpitations, dizziness.  He has a past medical history of coronary artery disease s/p stent 10 years ago, dyslipidemia, hypertension, type 2 diabetes.    In the ED while on telemetry monitor, patient went into rapid tachycardia, EDP had patient Valsalva which slowed his rate down and and was irregular, appeared to be atrial fibrillation.  EDP discussed with cardiology who agreed with cardioversion.  After patient was cardioverted he went to sinus rhythm briefly but then went back into atrial fibrillation with rapid ventricular rate. He was cardioverted again and again went back into sinus rhythm for a brief period of time. His labs also showed a mild acute kidney injury, likely due to his tachyarrhythmia. He was subsequently admitted.  Patient did well overnight, he was started on amiodarone with good response in his rate and resolution of his symptoms. His acute kidney injury resolved with IV fluids. He was started on Eliquis for anticoagulation. Echo was ordered and did not show any significant abnormalities. He was discharged home on 02/11/23 with plan for EP follow up as an outpatient.    Therefore, he is discharged in fair and stable condition to home with close outpatient follow-up.    The patient recovered much more quickly than anticipated on admission.    Discharge Date  02/11/23    FOLLOW UP ITEMS POST DISCHARGE  Follow up with electro-physiology as an outpatient for further management of Afib    DISCHARGE DIAGNOSES  Principal Problem:    Atrial fibrillation, new onset (HCC) POA: Yes  Active Problems:    Coronary artery disease involving native coronary artery of native heart without  angina pectoris POA: Yes    Primary hypertension POA: Yes    Dyslipidemia POA: Yes    Uncontrolled type 2 diabetes mellitus with hyperglycemia (HCC) POA: Yes    HEIDY (acute kidney injury) (HCC) POA: Yes  Resolved Problems:    * No resolved hospital problems. *      FOLLOW UP  No future appointments.  No follow-up provider specified.    MEDICATIONS ON DISCHARGE     Medication List        START taking these medications        Instructions   amiodarone 400 MG tablet  Commonly known as: PACERONE   Take 1 Tablet by mouth 2 times a day.  Dose: 400 mg     apixaban 5mg Tabs  Commonly known as: ELIQUIS   Take 1 Tablet by mouth 2 times a day. Indications: Thromboembolism secondary to Atrial Fibrillation  Dose: 5 mg            CONTINUE taking these medications        Instructions   carvedilol 3.125 MG Tabs  Commonly known as: COREG   Take 3.125 mg by mouth 2 times a day.  Dose: 3.125 mg     hydroCHLOROthiazide 25 MG Tabs  Commonly known as: HYDRODIURIL   Take 25 mg by mouth every day.  Dose: 25 mg     losartan 100 MG Tabs  Commonly known as: COZAAR   Take 100 mg by mouth every day.  Dose: 100 mg     simvastatin 80 MG tablet  Commonly known as: ZOCOR   Take 40 mg by mouth every day. 40 mg = 1/2 tablet  Dose: 40 mg            STOP taking these medications      aspirin EC 81 MG Tbec  Commonly known as: ECOTRIN              Allergies  No Known Allergies    DIET  Orders Placed This Encounter   Procedures    Diet Order Diet: Consistent CHO (Diabetic)     Standing Status:   Standing     Number of Occurrences:   1     Order Specific Question:   Diet:     Answer:   Consistent CHO (Diabetic) [4]       ACTIVITY  As tolerated.  Weight bearing as tolerated    CONSULTATIONS  Cardiology    PROCEDURES  Cardioversion x2    LABORATORY  Lab Results   Component Value Date    SODIUM 135 02/11/2023    POTASSIUM 3.4 (L) 02/11/2023    CHLORIDE 100 02/11/2023    CO2 23 02/11/2023    GLUCOSE 211 (H) 02/11/2023    BUN 30 (H) 02/11/2023    CREATININE 1.28  02/11/2023        Lab Results   Component Value Date    WBC 11.1 (H) 02/10/2023    HEMOGLOBIN 16.8 02/10/2023    HEMATOCRIT 47.4 02/10/2023    PLATELETCT 296 02/10/2023        Total time of the discharge process exceeds 33 minutes.

## 2023-02-11 NOTE — ASSESSMENT & PLAN NOTE
Creatinine 1.59 on presentation.  Baseline 1.1 per chart review unclear etiology, likely due to decreased cardiac perfusion.  Improving on repeat labs  Avoid nephrotoxic agents

## 2023-02-11 NOTE — PROGRESS NOTES
Report received from Isreal GALLO. Patient was transported to Tele 8 with this RN. Patient on RA, 0/10 pain,  on the monitor. POC discussed with patient, all questions answered. Call light within reach, bed locked and in lowest position.

## 2023-02-11 NOTE — ED PROVIDER NOTES
ED Provider Note    CHIEF COMPLAINT  Chief Complaint   Patient presents with    Chest Pain    Dizziness         HPI/ROS  LIMITATION TO HISTORY   Select: : None      Zac Wilkinson is a 68 y.o. male who presents with chief complaint of chest pain and dizziness.  Patient reports that over the last 5 days he has had progressively worsening dizziness on exertion and shortness of breath on exertion.  He reports that even walking from his house to his mailbox he is getting winded.  Patient reports associated mild chest pain with this.  He also reports some palpitations, but describes a relatively strong, painful heartbeat.  Patient denies any associated neck or back pain.  He denies any associated nausea or diaphoresis.  Patient does have a history of CAD and is status post stent placement around a decade ago which was placed in [facility].  Patient denies any fevers or chills.  Patient reports that while waiting in the waiting room his pain worsened significantly and he developed rapid palpitations and worsening dizziness.  Patient denies any associated back pain.  Patient denies any associated orthopnea or PND.  He denies any history of blood clots.  Patient is a non-smoker outside of occasional marijuana use.  Patient denies any heavy alcohol abuse.    PAST MEDICAL HISTORY       SURGICAL HISTORY  patient denies any surgical history    FAMILY HISTORY  History reviewed. No pertinent family history.    SOCIAL HISTORY  Social History     Tobacco Use    Smoking status: Never    Smokeless tobacco: Never   Vaping Use    Vaping Use: Never used   Substance and Sexual Activity    Alcohol use: Not Currently    Drug use: Yes     Types: Inhaled     Comment: marijuana    Sexual activity: Not on file       CURRENT MEDICATIONS  Home Medications       Reviewed by Tanisha Gaston R.N. (Registered Nurse) on 02/10/23 at 1502  Med List Status: Partial     Medication Last Dose Status        Patient Ac Taking any Medications              "              ALLERGIES  No Known Allergies    PHYSICAL EXAM  VITAL SIGNS: /86   Pulse (!) 152   Temp 36.7 °C (98.1 °F) (Temporal)   Resp 18   Ht 1.803 m (5' 11\")   Wt 87 kg (191 lb 12.8 oz)   SpO2 96%   BMI 26.75 kg/m²    Physical Exam  Constitutional:       Appearance: He is well-developed.   HENT:      Head: Normocephalic.   Eyes:      Pupils: Pupils are equal, round, and reactive to light.   Cardiovascular:      Rate and Rhythm: Tachycardia present. Rhythm irregular.      Heart sounds: Normal heart sounds.   Pulmonary:      Effort: Pulmonary effort is normal.      Breath sounds: Normal breath sounds.   Chest:      Chest wall: No mass, deformity or crepitus.   Abdominal:      Palpations: Abdomen is soft. There is no mass.      Tenderness: There is no abdominal tenderness. There is no rebound.   Skin:     General: Skin is warm.      Capillary Refill: Capillary refill takes less than 2 seconds.      Coloration: Skin is not pale.      Findings: No rash.   Neurological:      General: No focal deficit present.      Mental Status: He is alert and oriented to person, place, and time.   Psychiatric:         Mood and Affect: Mood normal.         Behavior: Behavior normal.         DIAGNOSTIC STUDIES / PROCEDURES  EKG  I have independently interpreted this EKG  See below    LABS  Results for orders placed or performed during the hospital encounter of 02/10/23   CBC with Differential   Result Value Ref Range    WBC 11.1 (H) 4.8 - 10.8 K/uL    RBC 5.34 4.70 - 6.10 M/uL    Hemoglobin 16.8 14.0 - 18.0 g/dL    Hematocrit 47.4 42.0 - 52.0 %    MCV 88.8 81.4 - 97.8 fL    MCH 31.5 27.0 - 33.0 pg    MCHC 35.4 (H) 33.7 - 35.3 g/dL    RDW 41.2 35.9 - 50.0 fL    Platelet Count 296 164 - 446 K/uL    MPV 11.8 9.0 - 12.9 fL    Neutrophils-Polys 64.70 44.00 - 72.00 %    Lymphocytes 22.40 22.00 - 41.00 %    Monocytes 10.50 0.00 - 13.40 %    Eosinophils 1.30 0.00 - 6.90 %    Basophils 0.60 0.00 - 1.80 %    Immature Granulocytes " 0.50 0.00 - 0.90 %    Nucleated RBC 0.00 /100 WBC    Neutrophils (Absolute) 7.17 1.82 - 7.42 K/uL    Lymphs (Absolute) 2.49 1.00 - 4.80 K/uL    Monos (Absolute) 1.17 (H) 0.00 - 0.85 K/uL    Eos (Absolute) 0.14 0.00 - 0.51 K/uL    Baso (Absolute) 0.07 0.00 - 0.12 K/uL    Immature Granulocytes (abs) 0.06 0.00 - 0.11 K/uL    NRBC (Absolute) 0.00 K/uL   Complete Metabolic Panel (CMP)   Result Value Ref Range    Sodium 136 135 - 145 mmol/L    Potassium 3.7 3.6 - 5.5 mmol/L    Chloride 102 96 - 112 mmol/L    Co2 20 20 - 33 mmol/L    Anion Gap 14.0 7.0 - 16.0    Glucose 265 (H) 65 - 99 mg/dL    Bun 29 (H) 8 - 22 mg/dL    Creatinine 1.59 (H) 0.50 - 1.40 mg/dL    Calcium 9.6 8.5 - 10.5 mg/dL    AST(SGOT) 18 12 - 45 U/L    ALT(SGPT) 26 2 - 50 U/L    Alkaline Phosphatase 93 30 - 99 U/L    Total Bilirubin 0.5 0.1 - 1.5 mg/dL    Albumin 4.4 3.2 - 4.9 g/dL    Total Protein 7.5 6.0 - 8.2 g/dL    Globulin 3.1 1.9 - 3.5 g/dL    A-G Ratio 1.4 g/dL   Troponins NOW   Result Value Ref Range    Troponin T 26 (H) 6 - 19 ng/L   Troponins in two (2) hours   Result Value Ref Range    Troponin T 26 (H) 6 - 19 ng/L   CORRECTED CALCIUM   Result Value Ref Range    Correct Calcium 9.3 8.5 - 10.5 mg/dL   ESTIMATED GFR   Result Value Ref Range    GFR (CKD-EPI) 47 (A) >60 mL/min/1.73 m 2   TSH WITH REFLEX TO FT4   Result Value Ref Range    TSH 1.240 0.380 - 5.330 uIU/mL   proBrain Natriuretic Peptide, NT   Result Value Ref Range    NT-proBNP 1493 (H) 0 - 125 pg/mL   EKG   Result Value Ref Range    Report       [facility]    Test Date:  2023-02-10  Pt Name:    NIMO KIRBY              Department: ER  MRN:        [id number]                      Room:  Gender:     Male                         Technician: [id number]  :        1955                   Requested By:ER TRIAGE PROTOCOL  Order #:    [id number]                    Reading MD:    Measurements  Intervals                                Axis  Rate:       102                           P:          77  OR:         128                          QRS:        -78  QRSD:       94                           T:          81  QT:         323  QTc:        421    Interpretive Statements  Sinus tachycardia  Consider right atrial enlargement  Left anterior fascicular block  No previous ECG available for comparison     EKG   Result Value Ref Range    Report       [facility]    Test Date:  2023-02-10  Pt Name:    NIMO KIRBY              Department: ER  MRN:        [id number]                      Room:  Gender:     Male                         Technician: [id number]  :        1955                   Requested By:ER TRIAGE PROTOCOL  Order #:    [id number]                    Reading MD: Michele Reyna MD    Measurements  Intervals                                Axis  Rate:       152                          P:          249  OR:         75                           QRS:        -41  QRSD:       97                           T:          74  QT:         330  QTc:        525    Interpretive Statements  EKG is atrial fibrillation with RVR  Electronically Signed On 2- 19:09:39 PST by Michele Reyna MD     EKG   Result Value Ref Range    Report       [facility]    Test Date:  2023-02-10  Pt Name:    NIMO KIRYB              Department: ER  MRN:        [id number]                      Room:       [facility]  Gender:     Male                         Technician: [id number]  :        1955                   Requested By:ER TRIAGE PROTOCOL  Order #:    [id number]                    Reading MD: Michele Reyna MD    Measurements  Intervals                                Axis  Rate:       92                           P:          46  OR:         131                          QRS:        -42  QRSD:       102                          T:          62  QT:         360  QTc:        446    Interpretive Statements  And check EKG sinus rhythm  normal axis mildly prolonged QRS consistent with  anterior fascicular block no ST changes consistent with acute regional  ischemia  Electronically Signed On 2- 19:09:21 PST by Michele Reyna MD     POCT glucose device results   Result Value Ref Range    POC Glucose, Blood 219 (H) 65 - 99 mg/dL         RADIOLOGY  I have independently interpreted the diagnostic imaging associated with this visit and am waiting the final reading from the radiologist.   My preliminary interpretation is a follows: X-ray is clear of any overt cardiopulmonary process  Radiologist interpretation:   DX-CHEST-PORTABLE (1 VIEW)   Final Result      1.  There is no acute cardiopulmonary process.      EC-ECHOCARDIOGRAM COMPLETE W/O CONT    (Results Pending)         COURSE & MEDICAL DECISION MAKING    Procedural sedation  Patient consented for procedural sedation, risk benefits and alternatives were discussed  Timeout was performed  Patient was placed on pulse oximetry, telemetry, and CO2 capnography.  Respiratory therapy and nursing at bedside.  Patient was given 13 mg of etomidate, patient required a second cardioversion and was given another 4 mg to keep him sedated  Patient tolerated well without any episodes of hypercapnia, hypoxia or apnea    Electrical cardioversion  Once patient was adequately sedated, synchronized cardioversion at 100 J resulted in a very brief return to sinus rhythm however shortly after this patient returning to atrial fibrillation with RVR.  A second cardioversion attempt was performed.  Patient remained in sinus rhythm, and shortly thereafter returned to atrial fibrillation with normal rate.  Patient continues to cycle between these 2 following the second synchronized cardioversion but without any rapid rate.  Upon waking patient's neurologic exam was unchanged.  Patient reports he felt significantly better.    INITIAL ASSESSMENT, COURSE AND PLAN  Care Narrative:     Patient here with what appears to be atrial  fibrillation with RVR.  I did have patient perform a Valsalva maneuver, this did slow his rate down slightly and showed a obvious irregular rhythm.  Patient with a normal QRS.  Patient with new onset of rapid rate  Patient with known onset of atrial fibrillation, he would qualify for possible cardioversion.  I discussed the case with cardiology, Dr. Bentley who is comfortable with me performing cardioversion.  I discussed this option with patient.  He is also comfortable.  Respiratory therapy, and etomidate were ordered.  In the interim I checked labs including a BNP.  Patient symptoms are concerning for possible underlying heart failure or worsening CAD.  Patient cardioverted, he actually required 2 episodes of cardioversion but following the second patient remained at cycling between atrial fibrillation and sinus rhythm, however both with normal rates.  Patient started on p.o. amiodarone per cardiology's recommendation.  Patient will be admitted for echocardiogram and further work-up.  Patient has remained with normal rate since his cardioversion.  Patient's basic labs reveal a mild elevated troponin which we would expect given his rapid rate at his age, his BNP is elevated, echo will be checked for further evaluation.  Patient also with a mild HEIDY, I am hesitant in getting patient a consider amount of fluids so as to not worsen his possible developing heart failure.  Patient's heart score is greater than 4      DISPOSITION AND DISCUSSIONS  I have discussed management of the patient with the following physicians and MAI's: Cardiology, hospitalist      Decision tools and prescription drugs considered including, but not limited to: Heart score    FINAL DIAGNOSIS  Chest pain, atrial fibrillation with RVR, electrical cardioversion, elevated troponin, elevated BNP

## 2023-02-11 NOTE — PROGRESS NOTES
Monitor Summary:    Rhythm: SR  Rate: 71-88  Ectopy: (R) PAC, (R) PVC  Measurement : .14/.12/.40

## 2023-02-11 NOTE — CARE PLAN
"The patient is Stable - Low risk of patient condition declining or worsening    Shift Goals  Clinical Goals: Monitor tele, vitals, & labs  Patient Goals: \"Go home\"  Family Goals: na    Progress made toward(s) clinical / shift goals:        Problem: Knowledge Deficit - Standard  Goal: Patient and family/care givers will demonstrate understanding of plan of care, disease process/condition, diagnostic tests and medications  Outcome: Progressing  Note: Patient verbally demonstrates understanding of POC and disease process. All patient questions answered.     Problem: Self Care  Goal: Patient will have the ability to perform ADLs independently or with assistance (bathe, groom, dress, toilet and feed)  Outcome: Progressing  Note: Patient performs all ADLs independently without assistance from staff         "

## 2023-02-14 NOTE — TELEPHONE ENCOUNTER
James Bentley M.D.  Kate Kauffman, Med Ass't; [facility] Schedulers Pool 3 days ago    Please arrange a stress and EP appt

## 2023-02-14 NOTE — TELEPHONE ENCOUNTER
Phone Number Called: [phone] (home)     Message: Patient has been scheduled for a stress test and an appointment with EP provider.

## 2023-02-15 LAB
EST. AVERAGE GLUCOSE BLD GHB EST-MCNC: 212 MG/DL
HBA1C MFR BLD: 9 % (ref 4–5.6)

## 2023-03-20 ENCOUNTER — OFFICE VISIT (OUTPATIENT)
Dept: CARDIOLOGY | Facility: MEDICAL CENTER | Age: 68
End: 2023-03-20
Payer: MEDICARE

## 2023-03-20 ENCOUNTER — TELEPHONE (OUTPATIENT)
Dept: CARDIOLOGY | Facility: MEDICAL CENTER | Age: 68
End: 2023-03-20

## 2023-03-20 VITALS
RESPIRATION RATE: 14 BRPM | OXYGEN SATURATION: 96 % | HEIGHT: 71 IN | HEART RATE: 68 BPM | SYSTOLIC BLOOD PRESSURE: 128 MMHG | DIASTOLIC BLOOD PRESSURE: 84 MMHG | WEIGHT: 195 LBS | BODY MASS INDEX: 27.3 KG/M2

## 2023-03-20 DIAGNOSIS — I48.91 ATRIAL FIBRILLATION, NEW ONSET (HCC): ICD-10-CM

## 2023-03-20 DIAGNOSIS — I10 PRIMARY HYPERTENSION: ICD-10-CM

## 2023-03-20 PROCEDURE — 99205 OFFICE O/P NEW HI 60 MIN: CPT | Mod: 25 | Performed by: INTERNAL MEDICINE

## 2023-03-20 PROCEDURE — 93000 ELECTROCARDIOGRAM COMPLETE: CPT | Performed by: INTERNAL MEDICINE

## 2023-03-20 ASSESSMENT — FIBROSIS 4 INDEX: FIB4 SCORE: 0.81

## 2023-03-20 NOTE — PROGRESS NOTES
Arrhythmia Clinic Note (New Patient)    DOS: 3/20/2023    Referring physician: James Bentley    Chief complaint/Reason for consult: AF/AFL    HPI:  Pt is a 69 yo M. He has a history of CAD s/p PCI to LAD ~10 years ago at [facility]. Since then he has followed with his cardiologist in [place]. He presented about a month and half ago with progressive lightheadedness/chest discomfort/and shortness of breath with exertion found to be in AF with RVR and then organizing into what appeared to be typical 2:1 flutter. Underwent cardioversion, then back briefly into AF with RVR, then cardioverted again. He was started on amiodarone and referred for consideration of catheter ablation. No complaints today. Taking and tolerating Eliquis and oral amio.    ROS (+ highlighted in red):  Constitutional: Fevers/chills/fatigue/weightloss  HEENT: Blurry vision/eye pain/sore throat/hearing loss  Respiratory: Shortness of breath/cough  Cardiovascular: Chest pain/palpitations/edema/orthopnea/syncope  GI: Nausea/vomitting/diarrhea  MSK: Arthralgias/myagias/muscle weakness  Skin: Rash/sores  Neurological: Numbness/tremors/vertigo  Endocrine: Excessive thirst/polyuria/cold intolerance/heat intolerance  Psych: Depression/anxiety    PMhx:  CAD  HTN  T2DM    Social History     Socioeconomic History    Marital status: Single     Spouse name: Not on file    Number of children: Not on file    Years of education: Not on file    Highest education level: Not on file   Occupational History    Not on file   Tobacco Use    Smoking status: Never    Smokeless tobacco: Never   Vaping Use    Vaping Use: Never used   Substance and Sexual Activity    Alcohol use: Not Currently    Drug use: Yes     Types: Inhaled     Comment: marijuana    Sexual activity: Not on file   Other Topics Concern    Not on file   Social History Narrative    Not on file     Social Determinants of Health     Financial Resource Strain: Not on file   Food Insecurity: Not on file  "  Transportation Needs: Not on file   Physical Activity: Not on file   Stress: Not on file   Social Connections: Not on file   Intimate Partner Violence: Not on file   Housing Stability: Not on file     FHx: No family history of premature CAD    No Known Allergies    Current Outpatient Medications   Medication Sig Dispense Refill    simvastatin (ZOCOR) 80 MG tablet Take 40 mg by mouth every day. 40 mg = 1/2 tablet      losartan (COZAAR) 100 MG Tab Take 100 mg by mouth every day.      carvedilol (COREG) 3.125 MG Tab Take 3.125 mg by mouth 2 times a day.      hydroCHLOROthiazide (HYDRODIURIL) 25 MG Tab Take 25 mg by mouth every day.      amiodarone (CORDARONE) 200 MG Tab Take 2 Tablets by mouth 2 times a day. 120 Tablet 0    apixaban (ELIQUIS) 5mg Tab Take 1 Tablet by mouth 2 times a day. Indications: Thromboembolism secondary to Atrial Fibrillation 60 Tablet 0     No current facility-administered medications for this visit.       Physical Exam:  Vitals:    03/20/23 1258   BP: 128/84   BP Location: Left arm   Patient Position: Sitting   BP Cuff Size: Adult   Pulse: 68   Resp: 14   SpO2: 96%   Weight: 88.5 kg (195 lb)   Height: 1.803 m (5' 11\")     General appearance: NAD, conversant  Eyes: anicteric sclerae, no lid-lag; PERRLA  HENT: Atraumatic; moist mucous membranes, no ulcerations  Neck: Trachea midline; FROM, supple, no thyromegaly  Lungs: CTA, with normal respiratory effort and no intercostal retractions  CV: RRR, no MRGs, no JVD  Abdomen: Soft, non-tender; normal bowel sounds, no HSM  Extremities: No peripheral edema. No clubbing or cyanosis.  Skin: Normal temperature, turgor and texture; no rash or ulcers  Psych: Appropriate affect, alert and oriented to person, place and time      Data:  Labs reviewed    Prior echo/stress reviewed:  LVEF 50%    EKG interpreted by me:  2:1 flutter    Impression/Plan:  1. Atrial fibrillation, new onset (HCC)  EKG    CL-EP ABLATION ATRIAL FIBRILLATION    EC-LEONEL W/ CONT      2. " Primary hypertension            -12 lead consistent with typical flutter, and also documented PAF  -I discussed I did not think long term amiodarone was good long term solution for him due to toxicity  -I discussed catheter ablation vs alternatives antiarrhythmics  -I discussed I thought he was good candidate for ablation and do well  -Risks/benefits discussed and he wants to proceed with procedure, will schedule next available    Sidra Tejada MD

## 2023-03-21 ENCOUNTER — APPOINTMENT (OUTPATIENT)
Dept: ADMISSIONS | Facility: MEDICAL CENTER | Age: 68
DRG: 314 | End: 2023-03-21
Attending: INTERNAL MEDICINE
Payer: MEDICARE

## 2023-03-21 NOTE — TELEPHONE ENCOUNTER
Patient is scheduled on 3-28-23 for an Afib ablation w/LEONEL w/anesthesia with Dr. Tejada. No meds to stop and patient to check in at 12:00 for a 2:00 procedure. Updated H&P to be done on admit by EP NP. Pre admit to call patient. Carto notified by email on 3-20-23 to be at procedure.

## 2023-03-21 NOTE — TELEPHONE ENCOUNTER
----- Message from Sidra Tejada M.D. sent at 3/20/2023  1:37 PM PDT -----  Let's schedule AF ablation next available. No meds to hold.    Sidra

## 2023-03-22 ENCOUNTER — PRE-ADMISSION TESTING (OUTPATIENT)
Dept: ADMISSIONS | Facility: MEDICAL CENTER | Age: 68
DRG: 314 | End: 2023-03-22
Attending: INTERNAL MEDICINE
Payer: MEDICARE

## 2023-03-22 RX ORDER — AMIODARONE HYDROCHLORIDE 400 MG/1
1 TABLET ORAL 2 TIMES DAILY
COMMUNITY
Start: 2023-03-16 | End: 2023-03-30

## 2023-03-27 ENCOUNTER — PRE-ADMISSION TESTING (OUTPATIENT)
Dept: ADMISSIONS | Facility: MEDICAL CENTER | Age: 68
DRG: 314 | End: 2023-03-27
Attending: INTERNAL MEDICINE
Payer: MEDICARE

## 2023-03-27 DIAGNOSIS — Z01.812 PRE-OPERATIVE LABORATORY EXAMINATION: ICD-10-CM

## 2023-03-27 DIAGNOSIS — Z01.810 PRE-OPERATIVE CARDIOVASCULAR EXAMINATION: ICD-10-CM

## 2023-03-27 LAB
ALBUMIN SERPL BCP-MCNC: 4.3 G/DL (ref 3.2–4.9)
ALBUMIN/GLOB SERPL: 1.4 G/DL
ALP SERPL-CCNC: 112 U/L (ref 30–99)
ALT SERPL-CCNC: 45 U/L (ref 2–50)
ANION GAP SERPL CALC-SCNC: 13 MMOL/L (ref 7–16)
AST SERPL-CCNC: 23 U/L (ref 12–45)
BASOPHILS # BLD AUTO: 0.7 % (ref 0–1.8)
BASOPHILS # BLD: 0.06 K/UL (ref 0–0.12)
BILIRUB SERPL-MCNC: 0.5 MG/DL (ref 0.1–1.5)
BUN SERPL-MCNC: 27 MG/DL (ref 8–22)
CALCIUM ALBUM COR SERPL-MCNC: 9.5 MG/DL (ref 8.5–10.5)
CALCIUM SERPL-MCNC: 9.7 MG/DL (ref 8.5–10.5)
CHLORIDE SERPL-SCNC: 100 MMOL/L (ref 96–112)
CO2 SERPL-SCNC: 21 MMOL/L (ref 20–33)
CREAT SERPL-MCNC: 1.36 MG/DL (ref 0.5–1.4)
EKG IMPRESSION: NORMAL
EOSINOPHIL # BLD AUTO: 0.28 K/UL (ref 0–0.51)
EOSINOPHIL NFR BLD: 3.5 % (ref 0–6.9)
ERYTHROCYTE [DISTWIDTH] IN BLOOD BY AUTOMATED COUNT: 43.8 FL (ref 35.9–50)
GFR SERPLBLD CREATININE-BSD FMLA CKD-EPI: 57 ML/MIN/1.73 M 2
GLOBULIN SER CALC-MCNC: 3.1 G/DL (ref 1.9–3.5)
GLUCOSE SERPL-MCNC: 338 MG/DL (ref 65–99)
HCT VFR BLD AUTO: 41.2 % (ref 42–52)
HGB BLD-MCNC: 14.5 G/DL (ref 14–18)
IMM GRANULOCYTES # BLD AUTO: 0.04 K/UL (ref 0–0.11)
IMM GRANULOCYTES NFR BLD AUTO: 0.5 % (ref 0–0.9)
INR PPP: 1.4 (ref 0.87–1.13)
LYMPHOCYTES # BLD AUTO: 1.05 K/UL (ref 1–4.8)
LYMPHOCYTES NFR BLD: 13 % (ref 22–41)
MCH RBC QN AUTO: 31.5 PG (ref 27–33)
MCHC RBC AUTO-ENTMCNC: 35.2 G/DL (ref 33.7–35.3)
MCV RBC AUTO: 89.6 FL (ref 81.4–97.8)
MONOCYTES # BLD AUTO: 0.81 K/UL (ref 0–0.85)
MONOCYTES NFR BLD AUTO: 10 % (ref 0–13.4)
NEUTROPHILS # BLD AUTO: 5.83 K/UL (ref 1.82–7.42)
NEUTROPHILS NFR BLD: 72.3 % (ref 44–72)
NRBC # BLD AUTO: 0 K/UL
NRBC BLD-RTO: 0 /100 WBC
PLATELET # BLD AUTO: 242 K/UL (ref 164–446)
PMV BLD AUTO: 11.3 FL (ref 9–12.9)
POTASSIUM SERPL-SCNC: 4 MMOL/L (ref 3.6–5.5)
PROT SERPL-MCNC: 7.4 G/DL (ref 6–8.2)
PROTHROMBIN TIME: 16.9 SEC (ref 12–14.6)
RBC # BLD AUTO: 4.6 M/UL (ref 4.7–6.1)
SODIUM SERPL-SCNC: 134 MMOL/L (ref 135–145)
WBC # BLD AUTO: 8.1 K/UL (ref 4.8–10.8)

## 2023-03-27 PROCEDURE — 85610 PROTHROMBIN TIME: CPT

## 2023-03-27 PROCEDURE — 93005 ELECTROCARDIOGRAM TRACING: CPT

## 2023-03-27 PROCEDURE — 36415 COLL VENOUS BLD VENIPUNCTURE: CPT

## 2023-03-27 PROCEDURE — 85025 COMPLETE CBC W/AUTO DIFF WBC: CPT

## 2023-03-27 PROCEDURE — 80053 COMPREHEN METABOLIC PANEL: CPT

## 2023-03-27 PROCEDURE — 93010 ELECTROCARDIOGRAM REPORT: CPT | Performed by: INTERNAL MEDICINE

## 2023-03-28 ENCOUNTER — ANESTHESIA EVENT (OUTPATIENT)
Dept: CARDIOLOGY | Facility: MEDICAL CENTER | Age: 68
DRG: 314 | End: 2023-03-28
Payer: MEDICARE

## 2023-03-28 ENCOUNTER — HOSPITAL ENCOUNTER (OUTPATIENT)
Facility: MEDICAL CENTER | Age: 68
DRG: 314 | End: 2023-03-28
Attending: INTERNAL MEDICINE | Admitting: INTERNAL MEDICINE
Payer: MEDICARE

## 2023-03-28 ENCOUNTER — APPOINTMENT (OUTPATIENT)
Dept: CARDIOLOGY | Facility: MEDICAL CENTER | Age: 68
DRG: 314 | End: 2023-03-28
Attending: INTERNAL MEDICINE
Payer: MEDICARE

## 2023-03-28 ENCOUNTER — ANESTHESIA (OUTPATIENT)
Dept: CARDIOLOGY | Facility: MEDICAL CENTER | Age: 68
DRG: 314 | End: 2023-03-28
Payer: MEDICARE

## 2023-03-28 VITALS
WEIGHT: 192.24 LBS | SYSTOLIC BLOOD PRESSURE: 152 MMHG | OXYGEN SATURATION: 94 % | DIASTOLIC BLOOD PRESSURE: 76 MMHG | TEMPERATURE: 96.5 F | RESPIRATION RATE: 18 BRPM | HEART RATE: 72 BPM | HEIGHT: 71 IN | BODY MASS INDEX: 26.91 KG/M2

## 2023-03-28 DIAGNOSIS — I48.91 ATRIAL FIBRILLATION, NEW ONSET (HCC): ICD-10-CM

## 2023-03-28 LAB
ACT BLD: 293 SEC (ref 74–137)
ACT BLD: 335 SEC (ref 74–137)
GLUCOSE BLD STRIP.AUTO-MCNC: 230 MG/DL (ref 65–99)
GLUCOSE BLD STRIP.AUTO-MCNC: 249 MG/DL (ref 65–99)
LV EJECT FRACT  99904: 60

## 2023-03-28 PROCEDURE — 700111 HCHG RX REV CODE 636 W/ 250 OVERRIDE (IP): Performed by: ANESTHESIOLOGY

## 2023-03-28 PROCEDURE — 85347 COAGULATION TIME ACTIVATED: CPT

## 2023-03-28 PROCEDURE — 160046 HCHG PACU - 1ST 60 MINS PHASE II

## 2023-03-28 PROCEDURE — 82962 GLUCOSE BLOOD TEST: CPT

## 2023-03-28 PROCEDURE — 93325 DOPPLER ECHO COLOR FLOW MAPG: CPT | Mod: 26 | Performed by: ANESTHESIOLOGY

## 2023-03-28 PROCEDURE — C1730 CATH, EP, 19 OR FEW ELECT: HCPCS

## 2023-03-28 PROCEDURE — 93655 ICAR CATH ABLTJ DSCRT ARRHYT: CPT | Performed by: INTERNAL MEDICINE

## 2023-03-28 PROCEDURE — 160035 HCHG PACU - 1ST 60 MINS PHASE I

## 2023-03-28 PROCEDURE — 700101 HCHG RX REV CODE 250

## 2023-03-28 PROCEDURE — 93325 DOPPLER ECHO COLOR FLOW MAPG: CPT

## 2023-03-28 PROCEDURE — 160002 HCHG RECOVERY MINUTES (STAT)

## 2023-03-28 PROCEDURE — 700105 HCHG RX REV CODE 258: Performed by: INTERNAL MEDICINE

## 2023-03-28 PROCEDURE — 700111 HCHG RX REV CODE 636 W/ 250 OVERRIDE (IP)

## 2023-03-28 PROCEDURE — 93312 ECHO TRANSESOPHAGEAL: CPT | Mod: 26,59 | Performed by: ANESTHESIOLOGY

## 2023-03-28 PROCEDURE — 00537 ANES CARDIAC EP PROCEDURES: CPT | Performed by: ANESTHESIOLOGY

## 2023-03-28 PROCEDURE — 700102 HCHG RX REV CODE 250 W/ 637 OVERRIDE(OP): Performed by: ANESTHESIOLOGY

## 2023-03-28 PROCEDURE — 93321 DOPPLER ECHO F-UP/LMTD STD: CPT | Mod: 26 | Performed by: ANESTHESIOLOGY

## 2023-03-28 PROCEDURE — 700101 HCHG RX REV CODE 250: Performed by: ANESTHESIOLOGY

## 2023-03-28 PROCEDURE — 93656 COMPRE EP EVAL ABLTJ ATR FIB: CPT | Performed by: INTERNAL MEDICINE

## 2023-03-28 RX ORDER — HEPARIN SODIUM 200 [USP'U]/100ML
INJECTION, SOLUTION INTRAVENOUS
Status: COMPLETED
Start: 2023-03-28 | End: 2023-03-28

## 2023-03-28 RX ORDER — OMEPRAZOLE 20 MG/1
20 CAPSULE, DELAYED RELEASE ORAL DAILY
Qty: 30 CAPSULE | Refills: 0 | Status: SHIPPED | OUTPATIENT
Start: 2023-03-28

## 2023-03-28 RX ORDER — HYDROMORPHONE HYDROCHLORIDE 1 MG/ML
0.2 INJECTION, SOLUTION INTRAMUSCULAR; INTRAVENOUS; SUBCUTANEOUS
Status: DISCONTINUED | OUTPATIENT
Start: 2023-03-28 | End: 2023-03-28 | Stop reason: HOSPADM

## 2023-03-28 RX ORDER — MIDAZOLAM HYDROCHLORIDE 1 MG/ML
INJECTION INTRAMUSCULAR; INTRAVENOUS
Status: DISCONTINUED
Start: 2023-03-28 | End: 2023-03-28 | Stop reason: HOSPADM

## 2023-03-28 RX ORDER — LIDOCAINE HYDROCHLORIDE 40 MG/ML
SOLUTION TOPICAL
Status: COMPLETED
Start: 2023-03-28 | End: 2023-03-28

## 2023-03-28 RX ORDER — BUPIVACAINE HYDROCHLORIDE 5 MG/ML
INJECTION, SOLUTION EPIDURAL; INTRACAUDAL
Status: COMPLETED
Start: 2023-03-28 | End: 2023-03-28

## 2023-03-28 RX ORDER — ACETAMINOPHEN 325 MG/1
650 TABLET ORAL EVERY 4 HOURS PRN
Status: DISCONTINUED | OUTPATIENT
Start: 2023-03-28 | End: 2023-03-28 | Stop reason: HOSPADM

## 2023-03-28 RX ORDER — DIPHENHYDRAMINE HYDROCHLORIDE 50 MG/ML
12.5 INJECTION INTRAMUSCULAR; INTRAVENOUS
Status: DISCONTINUED | OUTPATIENT
Start: 2023-03-28 | End: 2023-03-28 | Stop reason: HOSPADM

## 2023-03-28 RX ORDER — SUCRALFATE 1 G/1
1 TABLET ORAL
Qty: 56 TABLET | Refills: 0 | Status: SHIPPED | OUTPATIENT
Start: 2023-03-28 | End: 2023-04-11

## 2023-03-28 RX ORDER — DEXTROSE MONOHYDRATE 25 G/50ML
25 INJECTION, SOLUTION INTRAVENOUS
Status: DISCONTINUED | OUTPATIENT
Start: 2023-03-28 | End: 2023-03-28 | Stop reason: HOSPADM

## 2023-03-28 RX ORDER — HYDROMORPHONE HYDROCHLORIDE 1 MG/ML
0.4 INJECTION, SOLUTION INTRAMUSCULAR; INTRAVENOUS; SUBCUTANEOUS
Status: DISCONTINUED | OUTPATIENT
Start: 2023-03-28 | End: 2023-03-28 | Stop reason: HOSPADM

## 2023-03-28 RX ORDER — EPHEDRINE SULFATE 50 MG/ML
5 INJECTION, SOLUTION INTRAVENOUS
Status: DISCONTINUED | OUTPATIENT
Start: 2023-03-28 | End: 2023-03-28 | Stop reason: HOSPADM

## 2023-03-28 RX ORDER — AMIODARONE HYDROCHLORIDE 200 MG/1
200 TABLET ORAL DAILY
Qty: 60 TABLET | Refills: 0 | Status: SHIPPED | OUTPATIENT
Start: 2023-03-28 | End: 2023-06-08 | Stop reason: SDUPTHER

## 2023-03-28 RX ORDER — PHENYLEPHRINE HYDROCHLORIDE 10 MG/ML
INJECTION, SOLUTION INTRAMUSCULAR; INTRAVENOUS; SUBCUTANEOUS PRN
Status: DISCONTINUED | OUTPATIENT
Start: 2023-03-28 | End: 2023-03-28 | Stop reason: SURG

## 2023-03-28 RX ORDER — ONDANSETRON 2 MG/ML
INJECTION INTRAMUSCULAR; INTRAVENOUS PRN
Status: DISCONTINUED | OUTPATIENT
Start: 2023-03-28 | End: 2023-03-28 | Stop reason: SURG

## 2023-03-28 RX ORDER — ONDANSETRON 2 MG/ML
4 INJECTION INTRAMUSCULAR; INTRAVENOUS
Status: DISCONTINUED | OUTPATIENT
Start: 2023-03-28 | End: 2023-03-28 | Stop reason: HOSPADM

## 2023-03-28 RX ORDER — HEPARIN SODIUM 1000 [USP'U]/ML
INJECTION, SOLUTION INTRAVENOUS; SUBCUTANEOUS
Status: COMPLETED
Start: 2023-03-28 | End: 2023-03-28

## 2023-03-28 RX ORDER — PROTAMINE SULFATE 10 MG/ML
INJECTION, SOLUTION INTRAVENOUS
Status: COMPLETED
Start: 2023-03-28 | End: 2023-03-28

## 2023-03-28 RX ORDER — LIDOCAINE HYDROCHLORIDE 20 MG/ML
INJECTION, SOLUTION EPIDURAL; INFILTRATION; INTRACAUDAL; PERINEURAL PRN
Status: DISCONTINUED | OUTPATIENT
Start: 2023-03-28 | End: 2023-03-28 | Stop reason: SURG

## 2023-03-28 RX ORDER — SODIUM CHLORIDE, SODIUM LACTATE, POTASSIUM CHLORIDE, CALCIUM CHLORIDE 600; 310; 30; 20 MG/100ML; MG/100ML; MG/100ML; MG/100ML
INJECTION, SOLUTION INTRAVENOUS CONTINUOUS
Status: ACTIVE | OUTPATIENT
Start: 2023-03-28 | End: 2023-03-28

## 2023-03-28 RX ORDER — DEXAMETHASONE SODIUM PHOSPHATE 4 MG/ML
INJECTION, SOLUTION INTRA-ARTICULAR; INTRALESIONAL; INTRAMUSCULAR; INTRAVENOUS; SOFT TISSUE PRN
Status: DISCONTINUED | OUTPATIENT
Start: 2023-03-28 | End: 2023-03-28 | Stop reason: SURG

## 2023-03-28 RX ORDER — HYDRALAZINE HYDROCHLORIDE 20 MG/ML
5 INJECTION INTRAMUSCULAR; INTRAVENOUS
Status: DISCONTINUED | OUTPATIENT
Start: 2023-03-28 | End: 2023-03-28 | Stop reason: HOSPADM

## 2023-03-28 RX ORDER — HALOPERIDOL 5 MG/ML
1 INJECTION INTRAMUSCULAR
Status: DISCONTINUED | OUTPATIENT
Start: 2023-03-28 | End: 2023-03-28 | Stop reason: HOSPADM

## 2023-03-28 RX ORDER — LIDOCAINE HYDROCHLORIDE 20 MG/ML
INJECTION, SOLUTION INFILTRATION; PERINEURAL
Status: COMPLETED
Start: 2023-03-28 | End: 2023-03-28

## 2023-03-28 RX ORDER — SODIUM CHLORIDE, SODIUM LACTATE, POTASSIUM CHLORIDE, CALCIUM CHLORIDE 600; 310; 30; 20 MG/100ML; MG/100ML; MG/100ML; MG/100ML
INJECTION, SOLUTION INTRAVENOUS CONTINUOUS
Status: DISCONTINUED | OUTPATIENT
Start: 2023-03-28 | End: 2023-03-28 | Stop reason: HOSPADM

## 2023-03-28 RX ORDER — LIDOCAINE HYDROCHLORIDE 40 MG/ML
SOLUTION TOPICAL PRN
Status: DISCONTINUED | OUTPATIENT
Start: 2023-03-28 | End: 2023-03-28 | Stop reason: SURG

## 2023-03-28 RX ORDER — OXYCODONE HCL 5 MG/5 ML
5 SOLUTION, ORAL ORAL
Status: DISCONTINUED | OUTPATIENT
Start: 2023-03-28 | End: 2023-03-28 | Stop reason: HOSPADM

## 2023-03-28 RX ORDER — HYDROMORPHONE HYDROCHLORIDE 1 MG/ML
0.1 INJECTION, SOLUTION INTRAMUSCULAR; INTRAVENOUS; SUBCUTANEOUS
Status: DISCONTINUED | OUTPATIENT
Start: 2023-03-28 | End: 2023-03-28 | Stop reason: HOSPADM

## 2023-03-28 RX ORDER — OXYCODONE HCL 5 MG/5 ML
10 SOLUTION, ORAL ORAL
Status: DISCONTINUED | OUTPATIENT
Start: 2023-03-28 | End: 2023-03-28 | Stop reason: HOSPADM

## 2023-03-28 RX ADMIN — HEPARIN SODIUM 12000 UNITS: 1000 INJECTION, SOLUTION INTRAVENOUS; SUBCUTANEOUS at 12:53

## 2023-03-28 RX ADMIN — EPHEDRINE SULFATE 20 MG: 50 INJECTION INTRAMUSCULAR; INTRAVENOUS; SUBCUTANEOUS at 14:15

## 2023-03-28 RX ADMIN — LIDOCAINE HYDROCHLORIDE 4 ML: 40 SOLUTION TOPICAL at 14:11

## 2023-03-28 RX ADMIN — PHENYLEPHRINE HYDROCHLORIDE 100 MCG: 10 INJECTION INTRAVENOUS at 14:24

## 2023-03-28 RX ADMIN — EPHEDRINE SULFATE 10 MG: 50 INJECTION INTRAMUSCULAR; INTRAVENOUS; SUBCUTANEOUS at 14:27

## 2023-03-28 RX ADMIN — HEPARIN SODIUM 6000 UNITS: 200 INJECTION, SOLUTION INTRAVENOUS at 14:16

## 2023-03-28 RX ADMIN — ROCURONIUM BROMIDE 50 MG: 10 INJECTION, SOLUTION INTRAVENOUS at 14:11

## 2023-03-28 RX ADMIN — PROPOFOL 150 MG: 10 INJECTION, EMULSION INTRAVENOUS at 14:11

## 2023-03-28 RX ADMIN — BUPIVACAINE HYDROCHLORIDE: 5 INJECTION, SOLUTION EPIDURAL; INTRACAUDAL; PERINEURAL at 14:16

## 2023-03-28 RX ADMIN — LIDOCAINE HYDROCHLORIDE 40 MG: 20 INJECTION, SOLUTION EPIDURAL; INFILTRATION; INTRACAUDAL at 14:11

## 2023-03-28 RX ADMIN — PROTAMINE SULFATE 40 MG: 10 INJECTION, SOLUTION INTRAVENOUS at 15:55

## 2023-03-28 RX ADMIN — INSULIN HUMAN 3 UNITS: 100 INJECTION, SOLUTION PARENTERAL at 16:30

## 2023-03-28 RX ADMIN — SODIUM CHLORIDE, POTASSIUM CHLORIDE, SODIUM LACTATE AND CALCIUM CHLORIDE: 600; 310; 30; 20 INJECTION, SOLUTION INTRAVENOUS at 14:05

## 2023-03-28 RX ADMIN — SODIUM CHLORIDE, POTASSIUM CHLORIDE, SODIUM LACTATE AND CALCIUM CHLORIDE: 600; 310; 30; 20 INJECTION, SOLUTION INTRAVENOUS at 12:46

## 2023-03-28 RX ADMIN — PHENYLEPHRINE HYDROCHLORIDE 100 MCG: 10 INJECTION INTRAVENOUS at 14:18

## 2023-03-28 RX ADMIN — PROPOFOL 50 MG: 10 INJECTION, EMULSION INTRAVENOUS at 15:52

## 2023-03-28 RX ADMIN — ONDANSETRON 4 MG: 2 INJECTION INTRAMUSCULAR; INTRAVENOUS at 15:52

## 2023-03-28 RX ADMIN — DEXAMETHASONE SODIUM PHOSPHATE 4 MG: 4 INJECTION, SOLUTION INTRA-ARTICULAR; INTRALESIONAL; INTRAMUSCULAR; INTRAVENOUS; SOFT TISSUE at 14:13

## 2023-03-28 RX ADMIN — FENTANYL CITRATE 100 MCG: 50 INJECTION, SOLUTION INTRAMUSCULAR; INTRAVENOUS at 14:11

## 2023-03-28 RX ADMIN — PROPOFOL 50 MG: 10 INJECTION, EMULSION INTRAVENOUS at 15:54

## 2023-03-28 RX ADMIN — LIDOCAINE HYDROCHLORIDE: 20 INJECTION, SOLUTION INFILTRATION; PERINEURAL at 14:16

## 2023-03-28 ASSESSMENT — PAIN SCALES - GENERAL: PAIN_LEVEL: 0

## 2023-03-28 ASSESSMENT — FIBROSIS 4 INDEX: FIB4 SCORE: 0.96

## 2023-03-28 NOTE — ANESTHESIA POSTPROCEDURE EVALUATION
Patient: Zac Wilkinson    Procedure Summary     Date: 03/28/23 Room / Location: [facility] IMAGING - CATH [facility]    Anesthesia Start: 1405 Anesthesia Stop: 1608    Procedure: CL-EP ABLATION ATRIAL FIBRILLATION Diagnosis:       Atrial fibrillation, new onset (HCC)      Unspecified atrial fibrillation      (See Associated Dx)    Scheduled Providers: Sidra Tejada M.D.; Sudhir Marroquin M.D. Responsible Provider: Sudhir Marroquin M.D.    Anesthesia Type: general ASA Status: 3          Final Anesthesia Type: general  Last vitals  BP   137/85    Temp   36.2 °C (97.1 °F)    Pulse   71   Resp   16    SpO2   94 %      Anesthesia Post Evaluation    Patient location during evaluation: PACU  Patient participation: complete - patient participated  Level of consciousness: awake  Pain score: 0    Airway patency: patent  Anesthetic complications: no  Cardiovascular status: hemodynamically stable  Respiratory status: acceptable  Hydration status: euvolemic    PONV: none          No notable events documented.     Nurse Pain Score: 0 (NPRS)

## 2023-03-28 NOTE — OR NURSING
1609: Pt arrived from cath lab post a-fib ablation under anesthesia. Pt is arousable to RN voice. R groin sight is CDI; pedal pulse present. Cardiac rhythm appears to be SR. Pt denies pain or nausea.     1807: Report to CLEO Sheffield.     1816: Pt up to restroom with steady gait; voided; dressing is CDI. Pt to phase II via justin with RN. Pt on RA.

## 2023-03-28 NOTE — OP REPORT
[facility]  Electrophysiology Procedure Note    Procedure(s) Performed:   1) Atrial fibrillation ablation and EP study  2) Ablation of additional mechanism of arrhythmia    Indication(s):  Paroxysmal atrial fibrillation and atrial flutter    Physician(s): Sidra Tejada M.D.     Resident/Assistant(s): None     Anesthesia: General endotracheal anesthetic.     Specimen(s) Removed: None     Estimated Blood Loss:  30cc     Complications:  None     Description of Procedure:   After informed written consent, the patient was brought to the EP lab in the fasting, non-sedated state. The patient was prepped and draped in the usual sterile fashion. Femoral venous access was obtained using the modified Seldinger technique. In the right femoral vein, 3 sheaths (8,7,10 Fr) were inserted over 0.035” guidewires. A deflectable decapolar catheter was advanced to the CS position. Baseline rhythm was sinus 844 msec. An intracardiac echo (ICE) catheter was advanced to the right atrium. ICE was used to identify the CTI, atrial septum, left atrial appendage, and pulmonary veins and bonnie the anatomic structures to superimpose on our 3D electroanatomic map using CARTOSound. During the procedure, ICE was utilized to localize the ablation catheter, monitor for thrombus formation, and to exclude pericardial effusion. Intravenous heparin was administered to maintain -350 seconds. An 8.5 Fr RAMP sheath was exchanged and ablation was performed along the CTI at 40W using a Biosense ST SF ablation catheter. Bidirectional block was verified with differential pacing and trans-isthmus conduction time > 205 msec pacing from the septum. Transseptal left heart catheterization was performed under intracardiac echo and hemodynamic guidance. A [place] needle was inserted into the 8.5 Fr sheaths (SL1) for transseptal puncture and placement of sheaths in the left atrium. Mapping of the pulmonary veins and left atrium was performed using CARTO3 FAM  and a deflectable multipolar mapping catheter (Biosense PentaRay). Wide antral circumferential ablation was performed using an 3.5 mm deflectable irrigated force contact catheter (Biosense ST SF) at primarily 30-40 W power starting from the L sided veins and then proceeding along to the RSPV and then finally the RIPV. Bidirectional pulmonary vein antrum isolation was verified at the end of the procedure by pacing inside the vein and confirming exit block along with absence of local PV signals on the PentaRay. At the end of the procedure, heparin was reversed with protamine, the catheter and sheaths were removed, and hemostasis was achieved by manual compression along with Vascade MVP. Following recovery from anesthesia, the patient was transferred to the PACU in good condition.       Total ablation time: 1472 seconds    Fluoroscopy time: 6.5 minutes     Electrophysiologic Findings:    1. Sinus  844 ms, HV 37 ms. AVBCL = 430 ms.    Impressions:    1. Paroxysmal atrial fibrillation.    2. Successful RF ablation pulmonary vein isolation procedure.    3. Successful RF ablation of CTI     Recommendations:  1. Resume anticoagulation.  2. Start carafate x 2 weeks and daily PPI x 1 month.  3. Transfer to monitored bedrest.

## 2023-03-28 NOTE — DISCHARGE INSTRUCTIONS
HOME CARE INSTRUCTIONS    ACTIVITY: Rest and take it easy for the first 24 hours.  A responsible adult is recommended to remain with you during that time.  It is normal to feel sleepy.  We encourage you to not do anything that requires balance, judgment or coordination.    FOR 24 HOURS DO NOT:  Drive, operate machinery or run household appliances.  Drink beer or alcoholic beverages.  Make important decisions or sign legal documents.    SPECIAL INSTRUCTIONS:     LEONEL - TRANSESOPHAGEAL ECHOCARDIOGRAM  1. Don't drive or drink alcohol for 24 hours. The medication you received will make you too drowsy.  2. If you begin to vomit bloody material, or develop black or bloody stools, call your doctor as soon as possible.  3. If you have any neck, chest, abdominal pain or temp of 100 degrees, call your doctor.    You should call 911 if you develop problems with breathing or chest pain.      [facility] Post Ablation Patient Instructions:  No lifting > 10 lbs x 1 week.      No soaking in baths, hot tubs, pools x 1 week.  May shower the day after discharge and take off groin dressings and leave  sites uncovered.  Continue to monitor sites daily for warmth, redness, discolored drainage.  It is common to have a small lump in the area where the cather was (usually the size of a marble); this will go away but takes approximately 6 weeks to normalize.     3.   Please take all medications as prescribed to you; please do not stop any medications prescribed post ablation unless directed by your healthcare provider.      4.   Please do not miss any doses of your blood thinner (if you have been started on, or take chronic blood thinners) without discussion with your healthcare provider first.     5.   Please walk and take deep breaths after discharge.  After discharge, if you experience neurological changes/signs of stroke or high fever you should be seen in the emergency dept.     6.   It is possible you  may experience some chest discomfort or chest tightness post ablation.  This is usually secondary to inflammation and irritation of the tissues at the area of the ablation.  If this occurs, it is advised to try 400 mg of Ibuprofen with food as needed up to three times a day for a maximum of two days.  This should help to decrease pain and tissue inflammation.          **Please notify the office ([phone]) if this occurs.         ** DO NOT TAKE Ibuprofen IF HISTORY OF ALLERGY, SIGNIFICANT BLEEDING OR KIDNEY DISEASE WITHOUT DISCUSSING WITH YOUR CARDIOLOGY PROVIDER FIRST.          ** If pain becomes severe or you have additional symptoms you may need to be medically evaluated; please contact the cardiology office ([phone]) for further direction.     7. It is possible that you may experience arrhythmia/Atrial Fibrillation post ablation.  This is secondary to irritation and inflammation of the cardiac tissues from the ablation.  If you have atrial fibrillation all day or feel poorly with it, please notify your cardiologist's via phone ([phone]) or [employer].      8.  Please contact call our office ([phone]) or message via [employer] message if you have any questions or concerns post procedurally.    9. You need to be seen for post ablation follow up 3-4 weeks post procedure. An appointment is scheduled for you.  Please contact the office ([phone]) if you need to change your appointment.      DIET: To avoid nausea, slowly advance diet as tolerated, avoiding spicy or greasy foods for the first day.  Add more substantial food to your diet according to your physician's instructions.  INCREASE FLUIDS AND FIBER TO AVOID CONSTIPATION.    MEDICATIONS: Resume taking daily medication.  Take prescribed pain medication with food.  If no medication is prescribed, you may take non-aspirin pain medication if needed.  PAIN MEDICATION CAN BE VERY CONSTIPATING.  Take a stool softener or laxative such as senokot,  pericolace, or milk of magnesia if needed.    Prescription given for amiodarone, carafate, prilosec.    A follow-up appointment should be arranged with your doctor; call to schedule.    You should CALL YOUR PHYSICIAN if you develop:  Fever greater than 101 degrees F.  Pain not relieved by medication, or persistent nausea or vomiting.  Excessive bleeding (blood soaking through dressing) or unexpected drainage from the wound.  Extreme redness or swelling around the incision site, drainage of pus or foul smelling drainage.  Inability to urinate or empty your bladder within 8 hours.  Problems with breathing or chest pain.    You should call 911 if you develop problems with breathing or chest pain.  If you are unable to contact your doctor or surgical center, you should go to the nearest emergency room or urgent care center.  Physician's telephone #: [phone]    MILD FLU-LIKE SYMPTOMS ARE NORMAL.  YOU MAY EXPERIENCE GENERALIZED MUSCLE ACHES, THROAT IRRITATION, HEADACHE AND/OR SOME NAUSEA.    If any questions arise, call your doctor.  If your doctor is not available, please feel free to call the Surgical Center at [phone].  The Center is open Monday through Friday from 7AM to 7PM.      A registered nurse may call you a few days after your surgery to see how you are doing after your procedure.    You may also receive a survey in the mail within the next two weeks and we ask that you take a few moments to complete the survey and return it to us.  Our goal is to provide you with very good care and we value your comments.

## 2023-03-28 NOTE — ANESTHESIA PROCEDURE NOTES
Airway    Date/Time: 3/28/2023 2:11 PM  Performed by: Sudhir Marroquin M.D.  Authorized by: Sudhir Marroquin M.D.     Location:  OR  Urgency:  Elective  Indications for Airway Management:  Anesthesia      Spontaneous Ventilation: absent    Sedation Level:  Deep  Preoxygenated: Yes    Patient Position:  Sniffing  Mask Difficulty Assessment:  0 - not attempted  Final Airway Type:  Endotracheal airway  Final Endotracheal Airway:  ETT  Cuffed: Yes    Technique Used for Successful ETT Placement:  Direct laryngoscopy  Devices/Methods Used in Placement:  Intubating stylet    Insertion Site:  Oral  Blade Type:  Farrell  Laryngoscope Blade/Videolaryngoscope Blade Size:  2  ETT Size (mm):  7.5  Measured from:  Gums  ETT to Gums (cm):  23  Placement Verified by: auscultation and capnometry    Cormack-Lehane Classification:  Grade I - full view of glottis  Number of Attempts at Approach:  1

## 2023-03-28 NOTE — ANESTHESIA TIME REPORT
Anesthesia Start and Stop Event Times     Date Time Event    3/28/2023 1345 Ready for Procedure     1405 Anesthesia Start     1608 Anesthesia Stop        Responsible Staff  03/28/23    Name Role Begin End    Sudhir Marroquin M.D. Anesth 1405 1608        Overtime Reason:  no overtime (within assigned shift)    Comments:

## 2023-03-29 ENCOUNTER — APPOINTMENT (OUTPATIENT)
Dept: RADIOLOGY | Facility: MEDICAL CENTER | Age: 68
DRG: 314 | End: 2023-03-29
Attending: EMERGENCY MEDICINE
Payer: MEDICARE

## 2023-03-29 ENCOUNTER — HOSPITAL ENCOUNTER (INPATIENT)
Facility: MEDICAL CENTER | Age: 68
LOS: 1 days | DRG: 314 | End: 2023-03-31
Attending: EMERGENCY MEDICINE | Admitting: STUDENT IN AN ORGANIZED HEALTH CARE EDUCATION/TRAINING PROGRAM
Payer: MEDICARE

## 2023-03-29 DIAGNOSIS — Z98.890 H/O CARDIAC RADIOFREQUENCY ABLATION: ICD-10-CM

## 2023-03-29 DIAGNOSIS — I31.9 PERICARDITIS, UNSPECIFIED CHRONICITY, UNSPECIFIED TYPE: ICD-10-CM

## 2023-03-29 DIAGNOSIS — R07.9 CHEST PAIN, UNSPECIFIED TYPE: Primary | ICD-10-CM

## 2023-03-29 DIAGNOSIS — I10 HYPERTENSION, UNSPECIFIED TYPE: ICD-10-CM

## 2023-03-29 DIAGNOSIS — R79.89 ELEVATED TROPONIN: ICD-10-CM

## 2023-03-29 DIAGNOSIS — R73.9 HYPERGLYCEMIA: ICD-10-CM

## 2023-03-29 LAB
ALBUMIN SERPL BCP-MCNC: 4.3 G/DL (ref 3.2–4.9)
ALBUMIN/GLOB SERPL: 1.3 G/DL
ALP SERPL-CCNC: 107 U/L (ref 30–99)
ALT SERPL-CCNC: 74 U/L (ref 2–50)
ANION GAP SERPL CALC-SCNC: 17 MMOL/L (ref 7–16)
AST SERPL-CCNC: 42 U/L (ref 12–45)
BASOPHILS # BLD AUTO: 0.4 % (ref 0–1.8)
BASOPHILS # BLD: 0.05 K/UL (ref 0–0.12)
BILIRUB SERPL-MCNC: 1 MG/DL (ref 0.1–1.5)
BUN SERPL-MCNC: 33 MG/DL (ref 8–22)
CALCIUM ALBUM COR SERPL-MCNC: 9.6 MG/DL (ref 8.5–10.5)
CALCIUM SERPL-MCNC: 9.8 MG/DL (ref 8.5–10.5)
CHLORIDE SERPL-SCNC: 96 MMOL/L (ref 96–112)
CO2 SERPL-SCNC: 22 MMOL/L (ref 20–33)
CREAT SERPL-MCNC: 1.4 MG/DL (ref 0.5–1.4)
EKG IMPRESSION: NORMAL
EOSINOPHIL # BLD AUTO: 0.11 K/UL (ref 0–0.51)
EOSINOPHIL NFR BLD: 0.9 % (ref 0–6.9)
ERYTHROCYTE [DISTWIDTH] IN BLOOD BY AUTOMATED COUNT: 44.2 FL (ref 35.9–50)
GFR SERPLBLD CREATININE-BSD FMLA CKD-EPI: 55 ML/MIN/1.73 M 2
GLOBULIN SER CALC-MCNC: 3.3 G/DL (ref 1.9–3.5)
GLUCOSE SERPL-MCNC: 380 MG/DL (ref 65–99)
HCT VFR BLD AUTO: 41.1 % (ref 42–52)
HGB BLD-MCNC: 14.5 G/DL (ref 14–18)
IMM GRANULOCYTES # BLD AUTO: 0.06 K/UL (ref 0–0.11)
IMM GRANULOCYTES NFR BLD AUTO: 0.5 % (ref 0–0.9)
LACTATE SERPL-SCNC: 1.5 MMOL/L (ref 0.5–2)
LYMPHOCYTES # BLD AUTO: 1.22 K/UL (ref 1–4.8)
LYMPHOCYTES NFR BLD: 9.6 % (ref 22–41)
MCH RBC QN AUTO: 31.5 PG (ref 27–33)
MCHC RBC AUTO-ENTMCNC: 35.3 G/DL (ref 33.7–35.3)
MCV RBC AUTO: 89.3 FL (ref 81.4–97.8)
MONOCYTES # BLD AUTO: 1.92 K/UL (ref 0–0.85)
MONOCYTES NFR BLD AUTO: 15.1 % (ref 0–13.4)
NEUTROPHILS # BLD AUTO: 9.33 K/UL (ref 1.82–7.42)
NEUTROPHILS NFR BLD: 73.5 % (ref 44–72)
NRBC # BLD AUTO: 0 K/UL
NRBC BLD-RTO: 0 /100 WBC
NT-PROBNP SERPL IA-MCNC: 676 PG/ML (ref 0–125)
NT-PROBNP SERPL IA-MCNC: 728 PG/ML (ref 0–125)
PLATELET # BLD AUTO: 183 K/UL (ref 164–446)
PMV BLD AUTO: 11.6 FL (ref 9–12.9)
POTASSIUM SERPL-SCNC: 4.3 MMOL/L (ref 3.6–5.5)
PROT SERPL-MCNC: 7.6 G/DL (ref 6–8.2)
RBC # BLD AUTO: 4.6 M/UL (ref 4.7–6.1)
SODIUM SERPL-SCNC: 135 MMOL/L (ref 135–145)
TROPONIN T SERPL-MCNC: 981 NG/L (ref 6–19)
WBC # BLD AUTO: 12.7 K/UL (ref 4.8–10.8)

## 2023-03-29 PROCEDURE — 83605 ASSAY OF LACTIC ACID: CPT

## 2023-03-29 PROCEDURE — 36415 COLL VENOUS BLD VENIPUNCTURE: CPT

## 2023-03-29 PROCEDURE — 83880 ASSAY OF NATRIURETIC PEPTIDE: CPT

## 2023-03-29 PROCEDURE — 80053 COMPREHEN METABOLIC PANEL: CPT

## 2023-03-29 PROCEDURE — 700111 HCHG RX REV CODE 636 W/ 250 OVERRIDE (IP): Performed by: EMERGENCY MEDICINE

## 2023-03-29 PROCEDURE — 85025 COMPLETE CBC W/AUTO DIFF WBC: CPT

## 2023-03-29 PROCEDURE — 71045 X-RAY EXAM CHEST 1 VIEW: CPT

## 2023-03-29 PROCEDURE — 96375 TX/PRO/DX INJ NEW DRUG ADDON: CPT

## 2023-03-29 PROCEDURE — 99285 EMERGENCY DEPT VISIT HI MDM: CPT

## 2023-03-29 PROCEDURE — 96374 THER/PROPH/DIAG INJ IV PUSH: CPT

## 2023-03-29 PROCEDURE — 84484 ASSAY OF TROPONIN QUANT: CPT

## 2023-03-29 PROCEDURE — 93005 ELECTROCARDIOGRAM TRACING: CPT | Performed by: EMERGENCY MEDICINE

## 2023-03-29 PROCEDURE — 93005 ELECTROCARDIOGRAM TRACING: CPT

## 2023-03-29 PROCEDURE — 83735 ASSAY OF MAGNESIUM: CPT

## 2023-03-29 RX ORDER — MORPHINE SULFATE 4 MG/ML
4 INJECTION INTRAVENOUS ONCE
Status: COMPLETED | OUTPATIENT
Start: 2023-03-29 | End: 2023-03-29

## 2023-03-29 RX ORDER — HYDROMORPHONE HYDROCHLORIDE 1 MG/ML
1 INJECTION, SOLUTION INTRAMUSCULAR; INTRAVENOUS; SUBCUTANEOUS ONCE
Status: COMPLETED | OUTPATIENT
Start: 2023-03-29 | End: 2023-03-29

## 2023-03-29 RX ORDER — ONDANSETRON 2 MG/ML
4 INJECTION INTRAMUSCULAR; INTRAVENOUS ONCE
Status: COMPLETED | OUTPATIENT
Start: 2023-03-29 | End: 2023-03-29

## 2023-03-29 RX ADMIN — MORPHINE SULFATE 4 MG: 4 INJECTION INTRAVENOUS at 21:43

## 2023-03-29 RX ADMIN — HYDROMORPHONE HYDROCHLORIDE 1 MG: 1 INJECTION, SOLUTION INTRAMUSCULAR; INTRAVENOUS; SUBCUTANEOUS at 22:45

## 2023-03-29 RX ADMIN — ONDANSETRON HYDROCHLORIDE 4 MG: 2 SOLUTION INTRAMUSCULAR; INTRAVENOUS at 21:42

## 2023-03-29 ASSESSMENT — FIBROSIS 4 INDEX: FIB4 SCORE: 0.96

## 2023-03-29 ASSESSMENT — PAIN DESCRIPTION - PAIN TYPE
TYPE: ACUTE PAIN
TYPE: ACUTE PAIN

## 2023-03-29 NOTE — OR NURSING
Pt's VSS; denies N/V; states pain is at tolerable level. Dressing CDI to right groin. D/c orders received. IV dc'd. Discharge instructions given; pt verbalized understanding and questions answered. Patient states ready to d/c home. No prescriptions given, sent electronically to pt pharmacy. Pt dc'd in w/c with RN in stable condition.

## 2023-03-30 ENCOUNTER — APPOINTMENT (OUTPATIENT)
Dept: CARDIOLOGY | Facility: MEDICAL CENTER | Age: 68
DRG: 314 | End: 2023-03-30
Attending: NURSE PRACTITIONER
Payer: MEDICARE

## 2023-03-30 PROBLEM — D72.829 LEUKOCYTOSIS: Status: ACTIVE | Noted: 2023-03-30

## 2023-03-30 PROBLEM — Z86.79 S/P ABLATION OF ATRIAL FIBRILLATION: Status: ACTIVE | Noted: 2023-03-30

## 2023-03-30 PROBLEM — Z98.890 S/P ABLATION OF ATRIAL FIBRILLATION: Status: ACTIVE | Noted: 2023-03-30

## 2023-03-30 PROBLEM — J96.01 ACUTE HYPOXEMIC RESPIRATORY FAILURE (HCC): Status: ACTIVE | Noted: 2023-03-30

## 2023-03-30 PROBLEM — R07.9 CHEST PAIN: Status: ACTIVE | Noted: 2023-03-30

## 2023-03-30 PROBLEM — R79.89 ELEVATED TROPONIN: Status: ACTIVE | Noted: 2023-03-30

## 2023-03-30 PROBLEM — R74.8 ELEVATED LIVER ENZYMES: Status: ACTIVE | Noted: 2023-03-30

## 2023-03-30 PROBLEM — K76.9 LIVER LESION, LEFT LOBE: Status: ACTIVE | Noted: 2023-03-30

## 2023-03-30 LAB
EKG IMPRESSION: NORMAL
EKG IMPRESSION: NORMAL
GLUCOSE BLD STRIP.AUTO-MCNC: 246 MG/DL (ref 65–99)
GLUCOSE BLD STRIP.AUTO-MCNC: 301 MG/DL (ref 65–99)
GLUCOSE BLD STRIP.AUTO-MCNC: 332 MG/DL (ref 65–99)
GLUCOSE BLD STRIP.AUTO-MCNC: 350 MG/DL (ref 65–99)
GLUCOSE BLD STRIP.AUTO-MCNC: 386 MG/DL (ref 65–99)
MAGNESIUM SERPL-MCNC: 1.5 MG/DL (ref 1.5–2.5)
PROCALCITONIN SERPL-MCNC: 0.18 NG/ML
TROPONIN T SERPL-MCNC: 523 NG/L (ref 6–19)
TROPONIN T SERPL-MCNC: 799 NG/L (ref 6–19)

## 2023-03-30 PROCEDURE — 99222 1ST HOSP IP/OBS MODERATE 55: CPT | Mod: FS | Performed by: INTERNAL MEDICINE

## 2023-03-30 PROCEDURE — A9270 NON-COVERED ITEM OR SERVICE: HCPCS | Performed by: NURSE PRACTITIONER

## 2023-03-30 PROCEDURE — A9270 NON-COVERED ITEM OR SERVICE: HCPCS | Performed by: STUDENT IN AN ORGANIZED HEALTH CARE EDUCATION/TRAINING PROGRAM

## 2023-03-30 PROCEDURE — 700102 HCHG RX REV CODE 250 W/ 637 OVERRIDE(OP): Performed by: STUDENT IN AN ORGANIZED HEALTH CARE EDUCATION/TRAINING PROGRAM

## 2023-03-30 PROCEDURE — 82962 GLUCOSE BLOOD TEST: CPT

## 2023-03-30 PROCEDURE — 71275 CT ANGIOGRAPHY CHEST: CPT

## 2023-03-30 PROCEDURE — 700111 HCHG RX REV CODE 636 W/ 250 OVERRIDE (IP): Performed by: EMERGENCY MEDICINE

## 2023-03-30 PROCEDURE — 99232 SBSQ HOSP IP/OBS MODERATE 35: CPT | Mod: GC | Performed by: HOSPITALIST

## 2023-03-30 PROCEDURE — 93308 TTE F-UP OR LMTD: CPT

## 2023-03-30 PROCEDURE — 99223 1ST HOSP IP/OBS HIGH 75: CPT | Mod: AI | Performed by: STUDENT IN AN ORGANIZED HEALTH CARE EDUCATION/TRAINING PROGRAM

## 2023-03-30 PROCEDURE — 700105 HCHG RX REV CODE 258: Performed by: EMERGENCY MEDICINE

## 2023-03-30 PROCEDURE — 700117 HCHG RX CONTRAST REV CODE 255: Performed by: EMERGENCY MEDICINE

## 2023-03-30 PROCEDURE — 96375 TX/PRO/DX INJ NEW DRUG ADDON: CPT

## 2023-03-30 PROCEDURE — 36415 COLL VENOUS BLD VENIPUNCTURE: CPT

## 2023-03-30 PROCEDURE — 93005 ELECTROCARDIOGRAM TRACING: CPT

## 2023-03-30 PROCEDURE — 700102 HCHG RX REV CODE 250 W/ 637 OVERRIDE(OP): Performed by: NURSE PRACTITIONER

## 2023-03-30 PROCEDURE — 84484 ASSAY OF TROPONIN QUANT: CPT

## 2023-03-30 PROCEDURE — 93010 ELECTROCARDIOGRAM REPORT: CPT | Performed by: INTERNAL MEDICINE

## 2023-03-30 PROCEDURE — 96376 TX/PRO/DX INJ SAME DRUG ADON: CPT

## 2023-03-30 PROCEDURE — 700102 HCHG RX REV CODE 250 W/ 637 OVERRIDE(OP): Performed by: EMERGENCY MEDICINE

## 2023-03-30 PROCEDURE — 700111 HCHG RX REV CODE 636 W/ 250 OVERRIDE (IP): Performed by: NURSE PRACTITIONER

## 2023-03-30 PROCEDURE — 700111 HCHG RX REV CODE 636 W/ 250 OVERRIDE (IP): Performed by: HOSPITALIST

## 2023-03-30 PROCEDURE — 700102 HCHG RX REV CODE 250 W/ 637 OVERRIDE(OP)

## 2023-03-30 PROCEDURE — 770020 HCHG ROOM/CARE - TELE (206)

## 2023-03-30 PROCEDURE — 84145 PROCALCITONIN (PCT): CPT

## 2023-03-30 PROCEDURE — A9270 NON-COVERED ITEM OR SERVICE: HCPCS

## 2023-03-30 RX ORDER — HYDROCHLOROTHIAZIDE 25 MG/1
25 TABLET ORAL DAILY
Status: DISCONTINUED | OUTPATIENT
Start: 2023-03-30 | End: 2023-03-31 | Stop reason: HOSPADM

## 2023-03-30 RX ORDER — COLCHICINE 0.6 MG/1
0.6 TABLET ORAL 2 TIMES DAILY
Status: DISCONTINUED | OUTPATIENT
Start: 2023-03-30 | End: 2023-03-31 | Stop reason: HOSPADM

## 2023-03-30 RX ORDER — ACETAMINOPHEN 325 MG/1
650 TABLET ORAL EVERY 6 HOURS PRN
Status: DISCONTINUED | OUTPATIENT
Start: 2023-03-30 | End: 2023-03-31 | Stop reason: HOSPADM

## 2023-03-30 RX ORDER — IBUPROFEN 600 MG/1
600 TABLET ORAL EVERY 8 HOURS
Status: DISCONTINUED | OUTPATIENT
Start: 2023-03-30 | End: 2023-03-30

## 2023-03-30 RX ORDER — OMEPRAZOLE 20 MG/1
20 CAPSULE, DELAYED RELEASE ORAL DAILY
Status: DISCONTINUED | OUTPATIENT
Start: 2023-03-30 | End: 2023-03-31 | Stop reason: HOSPADM

## 2023-03-30 RX ORDER — MORPHINE SULFATE 4 MG/ML
4 INJECTION INTRAVENOUS EVERY 4 HOURS PRN
Status: DISCONTINUED | OUTPATIENT
Start: 2023-03-30 | End: 2023-03-31 | Stop reason: HOSPADM

## 2023-03-30 RX ORDER — LABETALOL HYDROCHLORIDE 5 MG/ML
10 INJECTION, SOLUTION INTRAVENOUS EVERY 4 HOURS PRN
Status: DISCONTINUED | OUTPATIENT
Start: 2023-03-30 | End: 2023-03-31 | Stop reason: HOSPADM

## 2023-03-30 RX ORDER — LOSARTAN POTASSIUM 50 MG/1
100 TABLET ORAL DAILY
Status: DISCONTINUED | OUTPATIENT
Start: 2023-03-30 | End: 2023-03-31 | Stop reason: HOSPADM

## 2023-03-30 RX ORDER — KETOROLAC TROMETHAMINE 30 MG/ML
15 INJECTION, SOLUTION INTRAMUSCULAR; INTRAVENOUS ONCE
Status: COMPLETED | OUTPATIENT
Start: 2023-03-30 | End: 2023-03-30

## 2023-03-30 RX ORDER — SIMVASTATIN 40 MG
40 TABLET ORAL DAILY
Status: DISCONTINUED | OUTPATIENT
Start: 2023-03-30 | End: 2023-03-30

## 2023-03-30 RX ORDER — AMIODARONE HYDROCHLORIDE 200 MG/1
200 TABLET ORAL DAILY
Status: DISCONTINUED | OUTPATIENT
Start: 2023-03-30 | End: 2023-03-31 | Stop reason: HOSPADM

## 2023-03-30 RX ORDER — ONDANSETRON 4 MG/1
4 TABLET, ORALLY DISINTEGRATING ORAL EVERY 4 HOURS PRN
Status: DISCONTINUED | OUTPATIENT
Start: 2023-03-30 | End: 2023-03-31 | Stop reason: HOSPADM

## 2023-03-30 RX ORDER — ONDANSETRON 2 MG/ML
4 INJECTION INTRAMUSCULAR; INTRAVENOUS EVERY 4 HOURS PRN
Status: DISCONTINUED | OUTPATIENT
Start: 2023-03-30 | End: 2023-03-31 | Stop reason: HOSPADM

## 2023-03-30 RX ORDER — AMOXICILLIN 250 MG
2 CAPSULE ORAL 2 TIMES DAILY
Status: DISCONTINUED | OUTPATIENT
Start: 2023-03-30 | End: 2023-03-31 | Stop reason: HOSPADM

## 2023-03-30 RX ORDER — BISACODYL 10 MG
10 SUPPOSITORY, RECTAL RECTAL
Status: DISCONTINUED | OUTPATIENT
Start: 2023-03-30 | End: 2023-03-31 | Stop reason: HOSPADM

## 2023-03-30 RX ORDER — SODIUM CHLORIDE 9 MG/ML
1000 INJECTION, SOLUTION INTRAVENOUS ONCE
Status: COMPLETED | OUTPATIENT
Start: 2023-03-30 | End: 2023-03-30

## 2023-03-30 RX ORDER — SIMVASTATIN 20 MG
20 TABLET ORAL DAILY
Status: DISCONTINUED | OUTPATIENT
Start: 2023-03-31 | End: 2023-03-31

## 2023-03-30 RX ORDER — SUCRALFATE 1 G/1
1 TABLET ORAL
Status: DISCONTINUED | OUTPATIENT
Start: 2023-03-30 | End: 2023-03-31 | Stop reason: HOSPADM

## 2023-03-30 RX ORDER — MORPHINE SULFATE 4 MG/ML
4 INJECTION INTRAVENOUS ONCE
Status: COMPLETED | OUTPATIENT
Start: 2023-03-30 | End: 2023-03-30

## 2023-03-30 RX ORDER — POLYETHYLENE GLYCOL 3350 17 G/17G
1 POWDER, FOR SOLUTION ORAL
Status: DISCONTINUED | OUTPATIENT
Start: 2023-03-30 | End: 2023-03-31 | Stop reason: HOSPADM

## 2023-03-30 RX ORDER — IBUPROFEN 600 MG/1
600 TABLET ORAL 2 TIMES DAILY
Status: DISCONTINUED | OUTPATIENT
Start: 2023-03-30 | End: 2023-03-31

## 2023-03-30 RX ORDER — CARVEDILOL 3.12 MG/1
3.12 TABLET ORAL 2 TIMES DAILY
Status: DISCONTINUED | OUTPATIENT
Start: 2023-03-30 | End: 2023-03-31 | Stop reason: HOSPADM

## 2023-03-30 RX ADMIN — APIXABAN 5 MG: 5 TABLET, FILM COATED ORAL at 06:23

## 2023-03-30 RX ADMIN — SODIUM CHLORIDE 1000 ML: 9 INJECTION, SOLUTION INTRAVENOUS at 00:50

## 2023-03-30 RX ADMIN — INSULIN HUMAN 5 UNITS: 100 INJECTION, SOLUTION PARENTERAL at 01:44

## 2023-03-30 RX ADMIN — AMIODARONE HYDROCHLORIDE 200 MG: 200 TABLET ORAL at 15:56

## 2023-03-30 RX ADMIN — SIMVASTATIN 40 MG: 40 TABLET, FILM COATED ORAL at 06:23

## 2023-03-30 RX ADMIN — SUCRALFATE 1 G: 1 TABLET ORAL at 11:52

## 2023-03-30 RX ADMIN — COLCHICINE 0.6 MG: 0.6 TABLET, FILM COATED ORAL at 15:54

## 2023-03-30 RX ADMIN — SUCRALFATE 1 G: 1 TABLET ORAL at 06:23

## 2023-03-30 RX ADMIN — CEFTRIAXONE SODIUM 2000 MG: 10 INJECTION, POWDER, FOR SOLUTION INTRAVENOUS at 11:52

## 2023-03-30 RX ADMIN — CARVEDILOL 3.12 MG: 3.12 TABLET, FILM COATED ORAL at 06:23

## 2023-03-30 RX ADMIN — HYDROCHLOROTHIAZIDE 25 MG: 25 TABLET ORAL at 06:23

## 2023-03-30 RX ADMIN — DOCUSATE SODIUM 50 MG AND SENNOSIDES 8.6 MG 2 TABLET: 8.6; 5 TABLET, FILM COATED ORAL at 06:23

## 2023-03-30 RX ADMIN — APIXABAN 5 MG: 5 TABLET, FILM COATED ORAL at 17:03

## 2023-03-30 RX ADMIN — LIDOCAINE HYDROCHLORIDE 30 ML: 20 SOLUTION OROPHARYNGEAL at 04:02

## 2023-03-30 RX ADMIN — MORPHINE SULFATE 4 MG: 4 INJECTION INTRAVENOUS at 01:39

## 2023-03-30 RX ADMIN — IBUPROFEN 600 MG: 600 TABLET, FILM COATED ORAL at 17:40

## 2023-03-30 RX ADMIN — SUCRALFATE 1 G: 1 TABLET ORAL at 21:11

## 2023-03-30 RX ADMIN — IOHEXOL 70 ML: 350 INJECTION, SOLUTION INTRAVENOUS at 00:08

## 2023-03-30 RX ADMIN — OMEPRAZOLE 20 MG: 20 CAPSULE, DELAYED RELEASE ORAL at 06:23

## 2023-03-30 RX ADMIN — KETOROLAC TROMETHAMINE 15 MG: 30 INJECTION, SOLUTION INTRAMUSCULAR; INTRAVENOUS at 15:55

## 2023-03-30 RX ADMIN — METFORMIN HYDROCHLORIDE 500 MG: 500 TABLET ORAL at 21:11

## 2023-03-30 RX ADMIN — LOSARTAN POTASSIUM 100 MG: 50 TABLET, FILM COATED ORAL at 06:23

## 2023-03-30 RX ADMIN — CARVEDILOL 3.12 MG: 3.12 TABLET, FILM COATED ORAL at 17:03

## 2023-03-30 RX ADMIN — SUCRALFATE 1 G: 1 TABLET ORAL at 17:02

## 2023-03-30 ASSESSMENT — ENCOUNTER SYMPTOMS
WEAKNESS: 1
DIZZINESS: 0
BACK PAIN: 0
CHOKING: 0
BLOOD IN STOOL: 0
PALPITATIONS: 0
COUGH: 0
STRIDOR: 0
FEVER: 0
PND: 0
DIAPHORESIS: 1
NECK PAIN: 0
SPUTUM PRODUCTION: 0
DOUBLE VISION: 0
ORTHOPNEA: 0
CHEST TIGHTNESS: 0
HEADACHES: 1
BLURRED VISION: 0
DIARRHEA: 0
NAUSEA: 1
APNEA: 0
CLAUDICATION: 0
SHORTNESS OF BREATH: 1
WHEEZING: 0
SHORTNESS OF BREATH: 0
NERVOUS/ANXIOUS: 0
VOMITING: 1
INSOMNIA: 0
DIZZINESS: 1
SORE THROAT: 0
CHILLS: 0
DEPRESSION: 0

## 2023-03-30 ASSESSMENT — LIFESTYLE VARIABLES
ON A TYPICAL DAY WHEN YOU DRINK ALCOHOL HOW MANY DRINKS DO YOU HAVE: 0
TOTAL SCORE: 0
ALCOHOL_USE: NO
HAVE YOU EVER FELT YOU SHOULD CUT DOWN ON YOUR DRINKING: NO
AVERAGE NUMBER OF DAYS PER WEEK YOU HAVE A DRINK CONTAINING ALCOHOL: 0
EVER FELT BAD OR GUILTY ABOUT YOUR DRINKING: NO
CONSUMPTION TOTAL: NEGATIVE
HOW MANY TIMES IN THE PAST YEAR HAVE YOU HAD 5 OR MORE DRINKS IN A DAY: 0
DOES PATIENT WANT TO STOP DRINKING: NO
EVER HAD A DRINK FIRST THING IN THE MORNING TO STEADY YOUR NERVES TO GET RID OF A HANGOVER: NO
HAVE PEOPLE ANNOYED YOU BY CRITICIZING YOUR DRINKING: NO
TOTAL SCORE: 0
TOTAL SCORE: 0

## 2023-03-30 ASSESSMENT — CHA2DS2 SCORE
CHF OR LEFT VENTRICULAR DYSFUNCTION: NO
HYPERTENSION: YES
AGE 75 OR GREATER: NO
DIABETES: YES
CHA2DS2 VASC SCORE: 4
AGE 65 TO 74: YES
VASCULAR DISEASE: YES
PRIOR STROKE OR TIA OR THROMBOEMBOLISM: NO
SEX: MALE

## 2023-03-30 ASSESSMENT — PATIENT HEALTH QUESTIONNAIRE - PHQ9
2. FEELING DOWN, DEPRESSED, IRRITABLE, OR HOPELESS: NOT AT ALL
SUM OF ALL RESPONSES TO PHQ9 QUESTIONS 1 AND 2: 0
1. LITTLE INTEREST OR PLEASURE IN DOING THINGS: NOT AT ALL

## 2023-03-30 ASSESSMENT — PAIN DESCRIPTION - PAIN TYPE
TYPE: ACUTE PAIN

## 2023-03-30 ASSESSMENT — FIBROSIS 4 INDEX
FIB4 SCORE: 1.81
FIB4 SCORE: 1.81

## 2023-03-30 NOTE — PROGRESS NOTES
"Patient admitted to T834 from ED. A/OX4. C/O 7/10 midsternal chest pain. Patient states it \"feels like its my esophagus.\" Able to ambulate from stretcher into bed.     Patient oriented to room and call bell system. Updated patient on plan of care for rest of shift. Patient agreeable.   "

## 2023-03-30 NOTE — ED TRIAGE NOTES
Chief Complaint   Patient presents with    Chest Pain     Patient reports mid chest pain with SOB,  N/V and feeling diaphoretic since 1500. Patient reports having an ablation yesterday with Dr. Tejada.

## 2023-03-30 NOTE — ED NOTES
Report to CLEO Dobbs. Pt awake and breathing with even, unlabored respirations on 2L NC at time of report.

## 2023-03-30 NOTE — PROGRESS NOTES
4 Eyes Skin Assessment Completed by CLEO Bynum and MAGO Price.    Head WDL  Ears WDL  Nose WDL  Mouth WDL  Neck WDL  Breast/Chest WDL  Shoulder Blades Redness, patient had ablation on 3/28. Redness on back from pads.  Spine WDL  (R) Arm/Elbow/Hand WDL  (L) Arm/Elbow/Hand WDL  Abdomen WDL  Groin. Ablation access site R groin. Soft. Dsg C/D/I. Ecchymosis around site.  Scrotum/Coccyx/Buttocks WDL  (R) Leg WDL  (L) Leg WDL  (R) Heel/Foot/Toe WDL  (L) Heel/Foot/Toe WDL          Devices In Places Tele Box      Interventions In Place Pillows    Possible Skin Injury No    Pictures Uploaded Into Epic N/A  Wound Consult Placed N/A  RN Wound Prevention Protocol Ordered No

## 2023-03-30 NOTE — ED NOTES
Pt continuing to complain of 10/10 chest pain post morphine. Pt saturating less than 88% on RA. Placed pt on 2L and pt saturating above 90%. ERP notified and medication orders received. Medicated pt as per MAR. Repeat EKG assessed by ERP.

## 2023-03-30 NOTE — ED NOTES
Pt to R11. Pt is A&Ox4 and awake in bed. Pt placed on cardiac monitor, pulse ox, and automatic BP. Saturating above 90% on RA, SR in the 70s. Call light within reach and chart up for ERP.

## 2023-03-30 NOTE — ED PROVIDER NOTES
ED Provider Note    Scribed for Ammon Christensen by Trevor Pearl. 3/29/2023  9:40 PM    Primary care provider: Shanae Monae P.A.-C.  Means of arrival: walk in  History obtained from: Patient  History limited by: None    CHIEF COMPLAINT  Chief Complaint   Patient presents with    Chest Pain     Patient reports mid chest pain with SOB,  N/V and feeling diaphoretic since 1500. Patient reports having an ablation yesterday with Dr. Tejada.      EXTERNAL RECORDS REVIEWED  Inpatient Notes Admitted yesterday, ablation was performed by Dr. Tejada. History of CAD, Afib with RVR. Was cardioverted yesterday, takes amiodarone and Eliquis    HPI/ROS    LIMITATION TO HISTORY   Select: : None  OUTSIDE HISTORIAN(S):  Family Son at bedside provided collateral information    HPI  Zac Wilkinson is a 68 y.o. male who presents to the Emergency Department for chest pain onset   He was admitted for an ablation, this was performed yesterday and he felt good after his procedure. His pain began in the morning and has began to get worse and worse. He states that it hurts to take a deep breath. His chest pain is currently localized to the left sternal area. He endorses nausea, vomiting and sweating. Last night he states that he was coughing a lot with associated sputum production. He denies any trauma to the region. He denies any cigarette use or alcohol use. He endorses still taking Eliquis.     REVIEW OF SYSTEMS  As above, all other systems reviewed and are negative.   See HPI for further details.     PAST MEDICAL HISTORY   has a past medical history of Arrhythmia, Dental disorder, Diabetes (HCC), and Myocardial infarct (HCC).  SURGICAL HISTORY   has a past surgical history that includes hernia repair and stent placement ().  SOCIAL HISTORY  Social History     Tobacco Use    Smoking status: Former     Types: Cigarettes     Quit date:      Years since quittin.2    Smokeless tobacco: Never   Vaping Use    Vaping Use:  Never used   Substance Use Topics    Alcohol use: Not Currently    Drug use: Yes     Types: Inhaled     Comment: marijuana      Social History     Substance and Sexual Activity   Drug Use Yes    Types: Inhaled    Comment: marijuana     FAMILY HISTORY  History reviewed. No pertinent family history.  CURRENT MEDICATIONS  Home Medications       Reviewed by Orquidea Vazquez R.N. (Registered Nurse) on 03/29/23 at 2116  Med List Status: Partial     Medication Last Dose Status   amiodarone (CORDARONE) 200 MG Tab  Active   apixaban (ELIQUIS) 5mg Tab  Active   carvedilol (COREG) 3.125 MG Tab  Active   hydroCHLOROthiazide (HYDRODIURIL) 25 MG Tab  Active   losartan (COZAAR) 100 MG Tab  Active   metformin (GLUCOPHAGE) 1000 MG tablet  Active   omeprazole (PRILOSEC) 20 MG delayed-release capsule  Active   PACERONE 400 MG tablet  Active   simvastatin (ZOCOR) 80 MG tablet  Active   sucralfate (CARAFATE) 1 GM Tab  Active                  ALLERGIES  Allergies   Allergen Reactions    Lisinopril Cough       PHYSICAL EXAM    VITAL SIGNS:   Vitals:    03/29/23 2300 03/30/23 0000 03/30/23 0051 03/30/23 0121   BP: 137/73 (!) 149/79 129/64 (!) 146/78   Pulse: 66 67 66 65   Resp: 16 17 18 16   Temp:       TempSrc:       SpO2: 92% 94% 92% 93%   Weight:       Height:         Vitals: My interpretation: hypertensive, not tachycardic, afebrile, not hypoxic    Reinterpretation of vitals: Hypertensive, not hypoxic, not tachycardic    Cardiac Monitor Interpretation: The cardiac monitor revealed normal Sinus Rhythm as interpreted by me. The cardiac monitor was ordered secondary to the patient's history of Afib with RVR and to monitor for dysrhythmia and/or tachycardia.    PE:   Gen: sitting comfortably, speaking clearly, appears in no acute distress   ENT: Mucous membranes moist, posterior pharynx clear, uvula midline, nares patent bilaterally   Neck: Supple, FROM  Pulmonary: Lungs are clear to auscultation bilaterally. No tachypnea  CV:  RRR, no  murmur appreciated, pulses 2+ in both upper and lower extremities  Abdomen: soft, NT/ND; no rebound/guarding  : no CVA or suprapubic tenderness   Neuro: A&Ox4 (person, place, time, situation), speech fluent, gait steady, no focal deficits appreciated  Skin: No rash or lesions.  No pallor or jaundice.  No cyanosis.  Warm and dry.     DIAGNOSTIC STUDIES / PROCEDURES    LABS  Results for orders placed or performed during the hospital encounter of 03/29/23   CBC with Differential   Result Value Ref Range    WBC 12.7 (H) 4.8 - 10.8 K/uL    RBC 4.60 (L) 4.70 - 6.10 M/uL    Hemoglobin 14.5 14.0 - 18.0 g/dL    Hematocrit 41.1 (L) 42.0 - 52.0 %    MCV 89.3 81.4 - 97.8 fL    MCH 31.5 27.0 - 33.0 pg    MCHC 35.3 33.7 - 35.3 g/dL    RDW 44.2 35.9 - 50.0 fL    Platelet Count 183 164 - 446 K/uL    MPV 11.6 9.0 - 12.9 fL    Neutrophils-Polys 73.50 (H) 44.00 - 72.00 %    Lymphocytes 9.60 (L) 22.00 - 41.00 %    Monocytes 15.10 (H) 0.00 - 13.40 %    Eosinophils 0.90 0.00 - 6.90 %    Basophils 0.40 0.00 - 1.80 %    Immature Granulocytes 0.50 0.00 - 0.90 %    Nucleated RBC 0.00 /100 WBC    Neutrophils (Absolute) 9.33 (H) 1.82 - 7.42 K/uL    Lymphs (Absolute) 1.22 1.00 - 4.80 K/uL    Monos (Absolute) 1.92 (H) 0.00 - 0.85 K/uL    Eos (Absolute) 0.11 0.00 - 0.51 K/uL    Baso (Absolute) 0.05 0.00 - 0.12 K/uL    Immature Granulocytes (abs) 0.06 0.00 - 0.11 K/uL    NRBC (Absolute) 0.00 K/uL   Complete Metabolic Panel (CMP)   Result Value Ref Range    Sodium 135 135 - 145 mmol/L    Potassium 4.3 3.6 - 5.5 mmol/L    Chloride 96 96 - 112 mmol/L    Co2 22 20 - 33 mmol/L    Anion Gap 17.0 (H) 7.0 - 16.0    Glucose 380 (H) 65 - 99 mg/dL    Bun 33 (H) 8 - 22 mg/dL    Creatinine 1.40 0.50 - 1.40 mg/dL    Calcium 9.8 8.5 - 10.5 mg/dL    AST(SGOT) 42 12 - 45 U/L    ALT(SGPT) 74 (H) 2 - 50 U/L    Alkaline Phosphatase 107 (H) 30 - 99 U/L    Total Bilirubin 1.0 0.1 - 1.5 mg/dL    Albumin 4.3 3.2 - 4.9 g/dL    Total Protein 7.6 6.0 - 8.2 g/dL     Globulin 3.3 1.9 - 3.5 g/dL    A-G Ratio 1.3 g/dL   Troponins NOW   Result Value Ref Range    Troponin T 981 (H) 6 - 19 ng/L   proBrain Natriuretic Peptide, NT   Result Value Ref Range    NT-proBNP 676 (H) 0 - 125 pg/mL   LACTIC ACID   Result Value Ref Range    Lactic Acid 1.5 0.5 - 2.0 mmol/L   proBrain Natriuretic Peptide, NT   Result Value Ref Range    NT-proBNP 728 (H) 0 - 125 pg/mL   CORRECTED CALCIUM   Result Value Ref Range    Correct Calcium 9.6 8.5 - 10.5 mg/dL   ESTIMATED GFR   Result Value Ref Range    GFR (CKD-EPI) 55 (A) >60 mL/min/1.73 m 2   TROPONIN   Result Value Ref Range    Troponin T 799 (H) 6 - 19 ng/L   EKG (Now)   Result Value Ref Range    Report       [facility] Emergency Dept.    Test Date:  2023  Pt Name:    NIMO KIRBY              Department: ER  MRN:        [id number]                      Room:  Gender:     Male                         Technician: [id number]  :        1955                   Requested By:ER TRIAGE PROTOCOL  Order #:    [id number]                    Reading MD: Ammon Christensen    Measurements  Intervals                                Axis  Rate:       73                           P:          56  MS:         161                          QRS:        -8  QRSD:       120                          T:          51  QT:         403  QTc:        444    Interpretive Statements  Sinus rhythm  Nonspecific intraventricular conduction delay  ST elevation, consider inferior injury  Compared to ECG 2023 10:17:58  Intraventricular conduction delay now present  ST (T wave) deviation now present  Myocardial infarct finding now present  Left-axis deviation no longer present  Electronically Signed  On 3- 21:40:04 PDT by Ammon Christensen     EKG   Result Value Ref Range    Report       [facility] Emergency Dept.    Test Date:  2023  Pt Name:    NIMO KIRBY              Department: ER  MRN:        [id number]                       Room:        11  Gender:     Male                         Technician: [id number]  :        1955                   Requested By:ER TRIAGE PROTOCOL  Order #:    [id number]                    Reading MD: Ammon Christensen    Measurements  Intervals                                Axis  Rate:       67                           P:          59  GA:         162                          QRS:        -19  QRSD:       114                          T:          46  QT:         434  QTc:        458    Interpretive Statements  Sinus rhythm  Borderline intraventricular conduction delay  ST elevation, consider lateral injury  Compared to ECG 2023 21:10:12  No significant changes  Electronically Signed On 3- 0:26:31 PDT by Ammon Christensen        All labs reviewed by me. Labs were compared to prior labs if they were available. Significant for mild leukocytosis of 12, no anemia, normal electrolytes, mild hyperglycemia, normal renal function, mild ALT transaminitis, normal bilirubin, initial troponin of 900, repeat troponin of 700, BMP normal, lactic acid normal    RADIOLOGY  I have independently interpreted the diagnostic imaging associated with this visit and am waiting the final reading from the radiologist.   My preliminary interpretation is a follows: Left sided pleural effusion, no cardiomegaly  Beside US performed by me shows: No pericardial effusion  Radiologist interpretation is as follows:  CT-CTA CHEST WITH & W/O-POST PROCESS   Final Result         1.  No pulmonary embolus appreciated.   2.  Bilateral lower lobe infiltrates.   3.  Low-density lesion left hepatic lobe, recommend follow-up MRI of the liver with contrast for further characterization to exclude underlying hepatic mass.   4.  Atherosclerosis and atherosclerotic coronary artery disease.      DX-CHEST-PORTABLE (1 VIEW)   Final Result         1.  Bibasilar atelectasis versus early infiltrate, greatest on the left.          COURSE & MEDICAL  DECISION MAKING  Nursing notes, VS, PMSFHx, labs, imaging, EKG reviewed in chart.    Heart Score: high     ED Observation Status? Yes; I am placing the patient in to an observation status due to a diagnostic uncertainty as well as therapeutic intensity. Patient placed in observation status at 9:48 PM, 3/29/2023.     Observation plan is as follows: Monitor for symptom management, diagnostic studies    Upon Reevaluation, the patient's condition has: not improved; and will be escalated to hospitalization.    Patient discharged from ED Observation status at 1:36 AM (Time) 3/30/2023 (Date).     Ddx: PE vs MI vs nSTEMI    MDM: 9:40 PM Zac Wilkinson is a 68 y.o. male who presented with acute chest pain that is severe in nature, centrally located, nonexertional, mildly pleuritic.  Patient is anticoagulated.  History of A-fib and coronary artery disease.  Was admitted yesterday for an ablation which was successful by Dr. Tejada of cardiology.  Upon arrival here patient is noted to be in severe discomfort and was immediately given IV Dilaudid as well as Zofran.  Mildly hypertensive likely secondary to pain but hemodynamically stable otherwise with normal vital signs.  EKG done emergently, showed no ischemic changes.  Chest x-ray by my read is unremarkable for acute consolidation or bony abnormality.  I did a bedside echo which showed no pericardial effusion is otherwise unremarkable on my interpretation.  Discussed with cardiology and they recommended doing a CT angiogram of the chest which was ordered. All labs reviewed by me. Labs were compared to prior labs if they were available. Significant for mild leukocytosis of 12, no anemia, normal electrolytes, mild hyperglycemia, normal renal function, mild ALT transaminitis, normal bilirubin, initial troponin of 900, repeat troponin of 700, BMP normal, lactic acid normal.  Troponin elevation is expected as patient underwent ablation yesterday and has no ischemic changes on  his EKG.  CT of the chest was without PE but did show questionable lower lobe infiltrates and a questionable mass of the liver likely unrelated to today's presentation.  Patient has no cough, congestion, fever or concerns for pneumonia clinically other than CT findings.  We will hold off on antibiotics at this time.  Patient required 3 separate doses of IV opiate pain medication with Dilaudid and morphine for persistent, intractable chest pain.  Unclear exactly what is driving it with his fairly unremarkable work-up here tonight, but as he is requiring IV pain medications and had a recent procedure done, will admit and have cardiology consult in the morning.  Discussed with the hospitalist team and they are amenable.    9:56 PM I discussed the patient's case and the above findings with Dr. Ravi (Cardiology) who recommends obtaining a CTA-chest for further evaluation.      ADDITIONAL PROBLEM LIST AND DISPOSITION    I have discussed management of the patient with the following physicians and MAI's:  Dr. Ravi (Cardiology), Hospitalist    Discussion of management with other [facility] or appropriate source(s): None     Barriers to care at this time, including but not limited to: None    Decision tools and prescription drugs considered including, but not limited to: Pain Medications morphine 4 mg .    FINAL IMPRESSION  1. Chest pain, unspecified type Acute   2. H/O cardiac radiofrequency ablation Acute   3. Elevated troponin Acute   4. Hyperglycemia Acute   5. Hypertension, unspecified type Acute      Trevor BUCKNER (Serena), am scribing for, and in the presence of, Ammon Christensen.    Electronically signed by: Trevor Pearl (Serena), 3/29/2023    IAmmon personally performed the services described in this documentation, as scribed by Trevor Pearl in my presence, and it is both accurate and complete.    The note accurately reflects work and decisions made by me.  Ammon Christensen  3/30/2023   1:54 AM

## 2023-03-30 NOTE — CARE PLAN
The patient is Stable - Low risk of patient condition declining or worsening    Shift Goals  Clinical Goals: monitor CP  Patient Goals: pain control  Family Goals: n/a    Progress made toward(s) clinical / shift goals:        Problem: Pain - Standard  Goal: Alleviation of pain or a reduction in pain to the patient’s comfort goal  Outcome: Progressing     Problem: Knowledge Deficit - Standard  Goal: Patient and family/care givers will demonstrate understanding of plan of care, disease process/condition, diagnostic tests and medications  Outcome: Progressing

## 2023-03-30 NOTE — DOCUMENTATION QUERY
"                                                                         [facility]                                                                       Query Response Note      PATIENT:               NIMO KIRBY  ACCT #:                  [id number]  MRN:                     [id number]  :                      1955  ADMIT DATE:       3/29/2023 9:14 PM  DISCH DATE:          RESPONDING  PROVIDER #:        [id number]           QUERY TEXT:    Please clarify the clinical relevance for the EKG findings: \"ST elevation, consider inferior injury\".      The patient's clinical indicators include:  EKG on admission noted SR, HR: 73, non-specific intraventricular conduction delay, ST elevation \"consider inferior injury\", ST (T wave) deviation, and \"Myocardial infarct finding now present\". Follow-up EKG on 3/29 noted SR, HR: 67, and ST elevation \"consider lateral injury\". Troponin T: 981 on admission with a decrease to 799. NT-proBNP: 728 on admission with a decrease to 676. \"Chest pain- Suspect esophageal/GI injury from afibrillation (sic) yesterday\" is documented in the H&P.    Treatments include: Eliquis, Coreg, HCTZ, Losartan, Dilaudid, and Morphine.    Risk factors include: dx Atrial Fibrillation s/p Cardioversion on 3/29/23, dx Acute Hypoxemic Respiratory Failure, hx DM II, and hx CAD.    Thank you,  Tommy Knowles RN, BSN  Clinical   Connect via [employer]  Options provided:   -- Clinically relevant- STEMI is ruled in   -- Clinically relevant- NSTEMI is ruled in   -- Findings are not related to STEMI or NSTEMI   -- Other explanation, Please specify   -- Unable to determine      Query created by: Tommy Knowles on 3/30/2023 9:38 AM    RESPONSE TEXT:    Findings are not related to STEMI or NSTEMI          Electronically signed by:  KELLY GREGG MD 3/30/2023 2:36 PM              "

## 2023-03-30 NOTE — ASSESSMENT & PLAN NOTE
Suspect esophageal/GI injury from afibrillation yesterday  Patient reports he was unable to  his sucralfate prescription  Serial EKGs thus far nonischemic  Troponin elevated however this is expected after ablation  No pericardial effusion on CTA chest or on my POCUS exam  GI cocktail to treat gastrointestinal pain  Continue home PPI and sucralfate  Treat pain with as needed IV morphine  Day team to consult cardiology

## 2023-03-30 NOTE — ASSESSMENT & PLAN NOTE
Suspect esophageal etiology or post-procedural tracheitis, though he may also have pericarditis.  Serial EKGs thus far nonischemic. Troponin elevated however this is expected after ablation  - GI cocktail to treat gastrointestinal pain. Continue home PPI and sucralfate.   - Per Cards, started on Colchicine for suspected pericarditis.

## 2023-03-30 NOTE — ASSESSMENT & PLAN NOTE
? Liver lesion on imaging, amiodarone, NAFLD  Denies alcohol use  No abdominal tenderness  Trend CMP

## 2023-03-30 NOTE — ASSESSMENT & PLAN NOTE
Underwent catheter ablation of atrial fibrillation by Dr. Tejada on 3/28/2023.  - Per Cardiology, continued on home Amiodarone, Apixaban.

## 2023-03-30 NOTE — ASSESSMENT & PLAN NOTE
Etiology: bibasilar atelectasis due to chest pain  Home O2: none  Reportedly saturating below 88% in the ED requiring 2 L supplemental oxygen  Supplemental O2  Will monitor oxygenation with continuous pulse oximetry; goal SpO2 88 to 92%  Incentive spirometry

## 2023-03-30 NOTE — ED NOTES
Med rec completed per patient  Allergies reviewed  No PO Antibiotics in the last 30 days     Patient states he was taken off Pacerone 400 mg twice daily and switched to Amiodarone 200 mg once daily.     Patient states his metformin has changed from twice daily to once daily.     Patient states he has not started Sucralfate, as he has not been able to get it yet.

## 2023-03-30 NOTE — ASSESSMENT & PLAN NOTE
"CTA chest: \"Low-density lesion left hepatic lobe, recommend follow-up MRI of the liver with contrast for further characterization to exclude underlying hepatic mass.\"  Outpatient follow-up  "

## 2023-03-30 NOTE — H&P
[facility] Internal Medicine History & Physical Note    Date of Service  3/30/2023    [facility] Team: Night Team  Attending: Paola Atkins M.d.  Senior Resident: Dr. Macedo  Intern:   Contact Number: [phone]    Primary Care Physician  Shanae Monae P.A.-C.    Consultants      Code Status  Full Code    Chief Complaint  Chief Complaint   Patient presents with    Chest Pain     Patient reports mid chest pain with SOB,  N/V and feeling diaphoretic since 1500. Patient reports having an ablation yesterday with Dr. Tejada.        History of Presenting Illness (HPI):   Zac Wilkinson is a 68 y.o. male who presented 3/29/2023 with chest pain.  Underwent ablation for atrial fibrillation by EP, Dr. Tejada yesterday 3/28/2023.  He is on anticoagulation with apixaban.  Past medical history includes CAD s/p PCI to LAD 10 years ago, hypertension, former smoker, diabetes.  Upon arrival to the ED he was hypertensive.  Troponin elevated at 981->728 and NT proBNP 728.  CTA of the chest done per cardiology, Dr. Ravi, recommendation was negative for PE.  He was treated with morphine, Dilaudid, fluid bolus.    Upon my examination, the patient said his chest pain had nearly resolved after receiving pain meds.  He says it started around 3 AM in the morning and was substernal, burning, pleuritic, nonradiating, and similar to his GERD pain.  He says throughout the day the pain progressively worsened and he develops associated nausea with nonbloody vomiting, diaphoresis, dyspnea, dizziness.  He tried self-medicating with Tums but this did not alleviate his pain prompting him to come to the ER.  He mentioned he had not been able to  his sucralfate prescription from the pharmacy yet.      Review of Systems  Review of Systems   Constitutional:  Positive for diaphoresis.   Respiratory:  Positive for shortness of breath.    Cardiovascular:  Positive for chest pain.   Gastrointestinal:  Positive for nausea and vomiting.   Neurological:   Positive for dizziness.   All other systems reviewed and are negative.    Past Medical History   has a past medical history of Arrhythmia, Dental disorder, Diabetes (HCC), and Myocardial infarct (HCC).    Surgical History   has a past surgical history that includes hernia repair and stent placement (2013).     Family History  family history is not on file.   Family history reviewed with patient.     Social History  Tobacco: Denies  Alcohol: Denies  Recreational drugs (illegal or prescription): Occasional MJ  Employment:   Living Situation:   Recent Travel:   Primary Care Provider: Reviewed  Other (stressors, spirituality, exposures):     Allergies  Allergies   Allergen Reactions    Lisinopril Cough       Medications  Prior to Admission Medications   Prescriptions Last Dose Informant Patient Reported? Taking?   PACERONE 400 MG tablet  Patient Yes No   Sig: Take 1 Tablet by mouth 2 times a day.   amiodarone (CORDARONE) 200 MG Tab   No No   Sig: Take 1 Tablet by mouth every day.   apixaban (ELIQUIS) 5mg Tab  Patient No No   Sig: Take 1 Tablet by mouth 2 times a day. Indications: Thromboembolism secondary to Atrial Fibrillation   carvedilol (COREG) 3.125 MG Tab  Patient Yes No   Sig: Take 3.125 mg by mouth 2 times a day.   hydroCHLOROthiazide (HYDRODIURIL) 25 MG Tab  Patient Yes No   Sig: Take 25 mg by mouth every day.   losartan (COZAAR) 100 MG Tab  Patient Yes No   Sig: Take 100 mg by mouth every day.   metformin (GLUCOPHAGE) 1000 MG tablet  Patient Yes No   Sig: Take 1 Tablet by mouth 2 times a day.   omeprazole (PRILOSEC) 20 MG delayed-release capsule   No No   Sig: Take 1 Capsule by mouth every day.   simvastatin (ZOCOR) 80 MG tablet  Patient Yes No   Sig: Take 40 mg by mouth every day. 40 mg = 1/2 tablet   sucralfate (CARAFATE) 1 GM Tab   No No   Sig: Take 1 Tablet by mouth 4 Times a Day,Before Meals and at Bedtime for 14 days.      Facility-Administered Medications: None       Physical Exam  Temp:  [36.3 °C (97.3  °F)] 36.3 °C (97.3 °F)  Pulse:  [62-73] 65  Resp:  [14-20] 16  BP: ()/(60-82) 146/78  SpO2:  [92 %-94 %] 93 %  Blood Pressure : (!) 146/78   Temperature: 36.3 °C (97.3 °F)   Pulse: 65   Respiration: 16   Pulse Oximetry: 93 %       Physical Exam  Constitutional:       General: He is not in acute distress.  HENT:      Head: Normocephalic and atraumatic.      Right Ear: External ear normal.      Left Ear: External ear normal.      Mouth/Throat:      Mouth: Mucous membranes are moist.   Eyes:      General: No scleral icterus.     Conjunctiva/sclera: Conjunctivae normal.   Cardiovascular:      Rate and Rhythm: Normal rate and regular rhythm.      Heart sounds: No murmur heard.  Pulmonary:      Effort: Pulmonary effort is normal. No respiratory distress.      Breath sounds: No wheezing or rales.   Abdominal:      Palpations: Abdomen is soft.      Tenderness: There is no abdominal tenderness.   Genitourinary:     Comments: Right groin ablation entry site nonedematous with bandage c/d/i  Musculoskeletal:      Right lower leg: No edema.      Left lower leg: No edema.   Skin:     General: Skin is warm and dry.   Neurological:      Mental Status: He is alert and oriented to person, place, and time.      Coordination: Coordination normal.   Psychiatric:         Behavior: Behavior normal.         Thought Content: Thought content normal.       Laboratory:  Recent Labs     03/27/23  1005 03/29/23 2133   WBC 8.1 12.7*   RBC 4.60* 4.60*   HEMOGLOBIN 14.5 14.5   HEMATOCRIT 41.2* 41.1*   MCV 89.6 89.3   MCH 31.5 31.5   MCHC 35.2 35.3   RDW 43.8 44.2   PLATELETCT 242 183   MPV 11.3 11.6     Recent Labs     03/27/23  1005 03/29/23 2133   SODIUM 134* 135   POTASSIUM 4.0 4.3   CHLORIDE 100 96   CO2 21 22   GLUCOSE 338* 380*   BUN 27* 33*   CREATININE 1.36 1.40   CALCIUM 9.7 9.8     Recent Labs     03/27/23  1005 03/29/23 2133   ALTSGPT 45 74*   ASTSGOT 23 42   ALKPHOSPHAT 112* 107*   TBILIRUBIN 0.5 1.0   GLUCOSE 338* 380*      Recent Labs     03/27/23  1005   INR 1.40*     Recent Labs     03/29/23  2133 03/29/23  2247   NTPROBNP 728* 676*         Recent Labs     03/29/23  2133 03/29/23  2247   TROPONINT 981* 799*       Imaging:  CT-CTA CHEST WITH & W/O-POST PROCESS   Final Result         1.  No pulmonary embolus appreciated.   2.  Bilateral lower lobe infiltrates.   3.  Low-density lesion left hepatic lobe, recommend follow-up MRI of the liver with contrast for further characterization to exclude underlying hepatic mass.   4.  Atherosclerosis and atherosclerotic coronary artery disease.      DX-CHEST-PORTABLE (1 VIEW)   Final Result         1.  Bibasilar atelectasis versus early infiltrate, greatest on the left.          Chest x-ray: I have personally reviewed the patient's chest x-ray.  Per my read, bibasilar opacities favoring atelectasis.     EKG: I have personally reviewed the patient's EKG and compared with prior EKG.  Per my read, sinus rhythm, no acute ST/T changes.      Assessment/Plan:  Problem Representation:   Zac Wilkinson is a 68 y.o. male with a past medical history significant for atrial fibrillation s/p ablation 3/28/2023, CAD s/p PCI 20 years ago, diabetes, hypertension, former smoker who presented to the hospital on 3/29/2023 with chest pain concerning for esophageal injury from A-fib ablation.    * Chest pain- (present on admission)  Assessment & Plan  Suspect esophageal/GI injury from afibrillation yesterday  Patient reports he was unable to  his sucralfate prescription  Serial EKGs thus far nonischemic  Troponin elevated however this is expected after ablation  No pericardial effusion on CTA chest or on my POCUS exam  GI cocktail to treat gastrointestinal pain  Continue home PPI and sucralfate  Treat pain with as needed IV morphine  Day team to consult cardiology      Acute hypoxemic respiratory failure (HCC)  Assessment & Plan  Etiology: bibasilar atelectasis due to chest pain  Home O2:  "none  Reportedly saturating below 88% in the ED requiring 2 L supplemental oxygen  CTA negative for PE  Supplemental O2  Will monitor oxygenation with continuous pulse oximetry; goal SpO2 88 to 92%  Incentive spirometry    Elevated troponin  Assessment & Plan  Etiology: Likely from ablation  Serial EKG nonischemic  Serial trop downtrending  Trend troponin and EKG    S/P ablation of atrial fibrillation  Assessment & Plan  Underwent ablation of A-fib by Dr. Tejada 3/28/2023  Per Dr. Tejada's procedure note, continue apixaban  Unclear if he is supposed to continue on amiodarone per chart.  Will hold amiodarone for now given long half-life and defer to day team to clarify with cardiology    Liver lesion, left lobe  Assessment & Plan  CTA chest: \"Low-density lesion left hepatic lobe, recommend follow-up MRI of the liver with contrast for further characterization to exclude underlying hepatic mass.\"  Outpatient follow-up    Elevated liver enzymes  Assessment & Plan  ? Liver lesion on imaging, amiodarone, NAFLD  Denies alcohol use  No abdominal tenderness  Trend CMP    Leukocytosis  Assessment & Plan  Stress demargination  Serial monitor CBC and watch fever curve, glucose control     Uncontrolled type 2 diabetes mellitus with hyperglycemia (HCC)- (present on admission)  Assessment & Plan  Hold home meds   SSI while inpatient  We will monitor blood glucose checks and titrate insulin as necessary; goal sugars 140-180  Diabetic diet when able    Dyslipidemia- (present on admission)  Assessment & Plan  Statin    Primary hypertension- (present on admission)  Assessment & Plan  Losartan, hydrochlorothiazide, carvedilol    Coronary artery disease involving native coronary artery of native heart without angina pectoris- (present on admission)  Assessment & Plan  Beta-blocker, ARB, statin  On apixaban for A-fib          VTE prophylaxis: SCDs/TEDs and therapeutic anticoagulation with apixaban    "

## 2023-03-30 NOTE — ASSESSMENT & PLAN NOTE
Etiology: bibasilar atelectasis due to chest pain  Home O2: none  Reportedly saturating below 88% in the ED requiring 2 L supplemental oxygen  CTA negative for PE  Supplemental O2  Will monitor oxygenation with continuous pulse oximetry; goal SpO2 88 to 92%  Incentive spirometry

## 2023-03-30 NOTE — ASSESSMENT & PLAN NOTE
Etiology: Likely from ablation  Serial EKG nonischemic  Serial trop downtrending  Trend troponin and EKG

## 2023-03-30 NOTE — CONSULTS
Cardiology Initial Consultation    Date of Service  3/30/2023    Referring Physician  ROSEANN Womack M.D.    Reason for Consultation    Pleuritic chest pain post A-fib ablation    History of Presenting Illness  Zac Wilkinson is a 68 y.o. male with a PMH of PAF/atrial flutter s/p successful RF ablation pulmonary vein isolation & successful RF ablation of CTI 3/28/2023, on apixaban, PCI LAD 2010, hypertension, hyperlipidemia, type 2 diabetes, LVEF 60% 3/28/2023 who presented 3/29/2023 with chest pain.    Review of Systems  Review of Systems   Respiratory:  Negative for apnea, cough, choking, chest tightness, shortness of breath, wheezing and stridor.    Cardiovascular:  Positive for chest pain. Negative for leg swelling.     Past Medical History   has a past medical history of Arrhythmia, Dental disorder, Diabetes (HCC), and Myocardial infarct (HCC).    Surgical History   has a past surgical history that includes hernia repair and stent placement (2013).    Family History  family history is not on file.    Social History   reports that he quit smoking about 55 years ago. His smoking use included cigarettes. He has never used smokeless tobacco. He reports that he does not currently use alcohol. He reports current drug use. Drug: Inhaled.    Medications  Prior to Admission Medications   Prescriptions Last Dose Informant Patient Reported? Taking?   amiodarone (CORDARONE) 200 MG Tab 3/29/2023 at AM Patient No No   Sig: Take 1 Tablet by mouth every day.   apixaban (ELIQUIS) 5mg Tab 3/29/2023 at AM Patient No No   Sig: Take 1 Tablet by mouth 2 times a day. Indications: Thromboembolism secondary to Atrial Fibrillation   carvedilol (COREG) 3.125 MG Tab 3/29/2023 at AM Patient Yes No   Sig: Take 3.125 mg by mouth 2 times a day.   hydroCHLOROthiazide (HYDRODIURIL) 25 MG Tab 3/29/2023 at AM Patient Yes No   Sig: Take 25 mg by mouth every day.   losartan (COZAAR) 100 MG Tab 3/29/2023 at AM Patient Yes No   Sig: Take  100 mg by mouth every day.   metformin (GLUCOPHAGE) 1000 MG tablet 3/29/2023 at AM Patient Yes No   Sig: Take 1 Tablet by mouth every day.   omeprazole (PRILOSEC) 20 MG delayed-release capsule 3/29/2023 at AM Patient No No   Sig: Take 1 Capsule by mouth every day.   simvastatin (ZOCOR) 80 MG tablet 3/29/2023 at AM Patient Yes No   Sig: Take 40 mg by mouth every day. 40 mg = 1/2 tablet   sucralfate (CARAFATE) 1 GM Tab Not started1 at Not started Patient No No   Sig: Take 1 Tablet by mouth 4 Times a Day,Before Meals and at Bedtime for 14 days.      Facility-Administered Medications: None       Allergies  Allergies   Allergen Reactions    Lisinopril Cough       Vital signs in last 24 hours  Temp:  [36.2 °C (97.2 °F)-36.6 °C (97.9 °F)] 36.6 °C (97.9 °F)  Pulse:  [62-73] 68  Resp:  [14-20] 16  BP: ()/(60-82) 112/63  SpO2:  [86 %-94 %] 91 %    Physical Exam  Physical Exam  Cardiovascular:      Rate and Rhythm: Normal rate and regular rhythm.      Pulses: Normal pulses.   Pulmonary:      Effort: Pulmonary effort is normal.   Abdominal:      General: Abdomen is flat.   Skin:     General: Skin is warm.   Neurological:      Mental Status: He is alert. Mental status is at baseline.   Psychiatric:         Mood and Affect: Mood normal.       Lab Review  Lab Results   Component Value Date/Time    WBC 12.7 (H) 03/29/2023 09:33 PM    RBC 4.60 (L) 03/29/2023 09:33 PM    HEMOGLOBIN 14.5 03/29/2023 09:33 PM    HEMATOCRIT 41.1 (L) 03/29/2023 09:33 PM    MCV 89.3 03/29/2023 09:33 PM    MCH 31.5 03/29/2023 09:33 PM    MCHC 35.3 03/29/2023 09:33 PM    MPV 11.6 03/29/2023 09:33 PM      Lab Results   Component Value Date/Time    SODIUM 135 03/29/2023 09:33 PM    POTASSIUM 4.3 03/29/2023 09:33 PM    CHLORIDE 96 03/29/2023 09:33 PM    CO2 22 03/29/2023 09:33 PM    GLUCOSE 380 (H) 03/29/2023 09:33 PM    BUN 33 (H) 03/29/2023 09:33 PM    CREATININE 1.40 03/29/2023 09:33 PM      Lab Results   Component Value Date/Time    ASTSGOT 42  03/29/2023 09:33 PM    ALTSGPT 74 (H) 03/29/2023 09:33 PM     Lab Results   Component Value Date/Time    TROPONINT 523 (H) 03/30/2023 09:11 AM       Recent Labs     03/29/23 2133 03/29/23 2247   NTPROBNP 728* 676*       Cardiac Imaging and Procedures Review  EKG:  My personal interpretation of the EKG dated 3/30/2023 is sinus rhythm    Echocardiogram:  LEONEL  3/28/2023  LVEF 60%    EP procedure note  3/28/2023  Successful RF ablation pulmonary vein isolation procedure  Successful RF ablation of CTI    Imaging  Chest X-Ray:   Bibasilar atelectasis versus early infiltrate, greatest on the left.         CTA chest 3/30/23  1.  No pulmonary embolus appreciated.  2.  Bilateral lower lobe infiltrates.  3.  Low-density lesion left hepatic lobe, recommend follow-up MRI of the liver with contrast for further characterization to exclude underlying hepatic mass.  4.  Atherosclerosis and atherosclerotic coronary artery disease.          Assessment/Plan    #Presented with chest pain status post recent RF ablation pulmonary vein isolation procedure 3/28/2023  #A-fib/flutter, diagnosed 2/11/2023  #On OAC with Eliquis  #On high risk medication amiodarone 200 daily  #Poorly controlled diabetes      Recommendations  - suspect post procedure pericarditis  -Toradol x1  -Colchicine 0.6 twice daily  -Follow-up on Limited echo   -Resume Amiodarone     Cardiology will follow along          Thank you for allowing me to participate in the care of this patient.    I will continue to follow this patient    Please contact me with any questions.    PUMA Garnica.   Cardiologist, [facility]  [phone]

## 2023-03-30 NOTE — ASSESSMENT & PLAN NOTE
Hold home meds   SSI while inpatient  We will monitor blood glucose checks and titrate insulin as necessary; goal sugars 140-180  Diabetic diet when able

## 2023-03-31 ENCOUNTER — PHARMACY VISIT (OUTPATIENT)
Dept: PHARMACY | Facility: MEDICAL CENTER | Age: 68
End: 2023-03-31
Payer: MEDICARE

## 2023-03-31 VITALS
HEART RATE: 62 BPM | OXYGEN SATURATION: 91 % | RESPIRATION RATE: 16 BRPM | TEMPERATURE: 98.1 F | WEIGHT: 190.26 LBS | BODY MASS INDEX: 26.64 KG/M2 | SYSTOLIC BLOOD PRESSURE: 119 MMHG | DIASTOLIC BLOOD PRESSURE: 72 MMHG | HEIGHT: 71 IN

## 2023-03-31 PROBLEM — R07.9 CHEST PAIN: Status: RESOLVED | Noted: 2023-03-30 | Resolved: 2023-03-31

## 2023-03-31 PROBLEM — J96.01 ACUTE HYPOXEMIC RESPIRATORY FAILURE (HCC): Status: RESOLVED | Noted: 2023-03-30 | Resolved: 2023-03-31

## 2023-03-31 PROBLEM — E11.65 UNCONTROLLED TYPE 2 DIABETES MELLITUS WITH HYPERGLYCEMIA (HCC): Status: RESOLVED | Noted: 2023-02-11 | Resolved: 2023-03-31

## 2023-03-31 PROBLEM — D72.829 LEUKOCYTOSIS: Status: RESOLVED | Noted: 2023-03-30 | Resolved: 2023-03-31

## 2023-03-31 LAB
ALBUMIN SERPL BCP-MCNC: 3.2 G/DL (ref 3.2–4.9)
ALBUMIN/GLOB SERPL: 1.1 G/DL
ALP SERPL-CCNC: 81 U/L (ref 30–99)
ALT SERPL-CCNC: 70 U/L (ref 2–50)
ANION GAP SERPL CALC-SCNC: 12 MMOL/L (ref 7–16)
AST SERPL-CCNC: 38 U/L (ref 12–45)
BASOPHILS # BLD AUTO: 0.7 % (ref 0–1.8)
BASOPHILS # BLD: 0.06 K/UL (ref 0–0.12)
BILIRUB SERPL-MCNC: 0.4 MG/DL (ref 0.1–1.5)
BUN SERPL-MCNC: 32 MG/DL (ref 8–22)
CALCIUM ALBUM COR SERPL-MCNC: 9 MG/DL (ref 8.5–10.5)
CALCIUM SERPL-MCNC: 8.4 MG/DL (ref 8.5–10.5)
CHLORIDE SERPL-SCNC: 97 MMOL/L (ref 96–112)
CO2 SERPL-SCNC: 23 MMOL/L (ref 20–33)
CREAT SERPL-MCNC: 1.51 MG/DL (ref 0.5–1.4)
CRP SERPL HS-MCNC: 10 MG/DL (ref 0–0.75)
EOSINOPHIL # BLD AUTO: 0.22 K/UL (ref 0–0.51)
EOSINOPHIL NFR BLD: 2.6 % (ref 0–6.9)
ERYTHROCYTE [DISTWIDTH] IN BLOOD BY AUTOMATED COUNT: 45.7 FL (ref 35.9–50)
ERYTHROCYTE [SEDIMENTATION RATE] IN BLOOD BY WESTERGREN METHOD: 100 MM/HOUR (ref 0–20)
GFR SERPLBLD CREATININE-BSD FMLA CKD-EPI: 50 ML/MIN/1.73 M 2
GLOBULIN SER CALC-MCNC: 2.8 G/DL (ref 1.9–3.5)
GLUCOSE BLD STRIP.AUTO-MCNC: 235 MG/DL (ref 65–99)
GLUCOSE SERPL-MCNC: 268 MG/DL (ref 65–99)
HCT VFR BLD AUTO: 36.5 % (ref 42–52)
HGB BLD-MCNC: 12.4 G/DL (ref 14–18)
IMM GRANULOCYTES # BLD AUTO: 0.07 K/UL (ref 0–0.11)
IMM GRANULOCYTES NFR BLD AUTO: 0.8 % (ref 0–0.9)
LV EJECT FRACT  99904: 60
LYMPHOCYTES # BLD AUTO: 1.54 K/UL (ref 1–4.8)
LYMPHOCYTES NFR BLD: 18.3 % (ref 22–41)
MCH RBC QN AUTO: 31.4 PG (ref 27–33)
MCHC RBC AUTO-ENTMCNC: 34 G/DL (ref 33.7–35.3)
MCV RBC AUTO: 92.4 FL (ref 81.4–97.8)
MONOCYTES # BLD AUTO: 1.13 K/UL (ref 0–0.85)
MONOCYTES NFR BLD AUTO: 13.5 % (ref 0–13.4)
NEUTROPHILS # BLD AUTO: 5.38 K/UL (ref 1.82–7.42)
NEUTROPHILS NFR BLD: 64.1 % (ref 44–72)
NRBC # BLD AUTO: 0 K/UL
NRBC BLD-RTO: 0 /100 WBC
PLATELET # BLD AUTO: 194 K/UL (ref 164–446)
PMV BLD AUTO: 10.7 FL (ref 9–12.9)
POTASSIUM SERPL-SCNC: 3.8 MMOL/L (ref 3.6–5.5)
PROT SERPL-MCNC: 6 G/DL (ref 6–8.2)
RBC # BLD AUTO: 3.95 M/UL (ref 4.7–6.1)
SODIUM SERPL-SCNC: 132 MMOL/L (ref 135–145)
WBC # BLD AUTO: 8.4 K/UL (ref 4.8–10.8)

## 2023-03-31 PROCEDURE — 99232 SBSQ HOSP IP/OBS MODERATE 35: CPT | Mod: FS | Performed by: INTERNAL MEDICINE

## 2023-03-31 PROCEDURE — RXMED WILLOW AMBULATORY MEDICATION CHARGE: Performed by: NURSE PRACTITIONER

## 2023-03-31 PROCEDURE — 36415 COLL VENOUS BLD VENIPUNCTURE: CPT

## 2023-03-31 PROCEDURE — 85652 RBC SED RATE AUTOMATED: CPT

## 2023-03-31 PROCEDURE — 86140 C-REACTIVE PROTEIN: CPT

## 2023-03-31 PROCEDURE — 80053 COMPREHEN METABOLIC PANEL: CPT

## 2023-03-31 PROCEDURE — 700102 HCHG RX REV CODE 250 W/ 637 OVERRIDE(OP): Performed by: STUDENT IN AN ORGANIZED HEALTH CARE EDUCATION/TRAINING PROGRAM

## 2023-03-31 PROCEDURE — A9270 NON-COVERED ITEM OR SERVICE: HCPCS | Performed by: STUDENT IN AN ORGANIZED HEALTH CARE EDUCATION/TRAINING PROGRAM

## 2023-03-31 PROCEDURE — 700111 HCHG RX REV CODE 636 W/ 250 OVERRIDE (IP)

## 2023-03-31 PROCEDURE — 85025 COMPLETE CBC W/AUTO DIFF WBC: CPT

## 2023-03-31 PROCEDURE — 99238 HOSP IP/OBS DSCHRG MGMT 30/<: CPT | Mod: GC | Performed by: HOSPITALIST

## 2023-03-31 PROCEDURE — 82962 GLUCOSE BLOOD TEST: CPT

## 2023-03-31 PROCEDURE — 700102 HCHG RX REV CODE 250 W/ 637 OVERRIDE(OP): Performed by: NURSE PRACTITIONER

## 2023-03-31 PROCEDURE — 93308 TTE F-UP OR LMTD: CPT | Mod: 26 | Performed by: INTERNAL MEDICINE

## 2023-03-31 PROCEDURE — A9270 NON-COVERED ITEM OR SERVICE: HCPCS | Performed by: NURSE PRACTITIONER

## 2023-03-31 RX ORDER — ATORVASTATIN CALCIUM 40 MG/1
40 TABLET, FILM COATED ORAL EVERY EVENING
Qty: 30 TABLET | Refills: 11 | Status: SHIPPED | OUTPATIENT
Start: 2023-03-31

## 2023-03-31 RX ORDER — COLCHICINE 0.6 MG/1
0.6 TABLET ORAL DAILY
Qty: 30 TABLET | Refills: 0 | Status: SHIPPED | OUTPATIENT
Start: 2023-03-31

## 2023-03-31 RX ORDER — ATORVASTATIN CALCIUM 40 MG/1
40 TABLET, FILM COATED ORAL EVERY EVENING
Status: DISCONTINUED | OUTPATIENT
Start: 2023-03-31 | End: 2023-03-31 | Stop reason: HOSPADM

## 2023-03-31 RX ADMIN — APIXABAN 5 MG: 5 TABLET, FILM COATED ORAL at 04:38

## 2023-03-31 RX ADMIN — CEFTRIAXONE SODIUM 2000 MG: 10 INJECTION, POWDER, FOR SOLUTION INTRAVENOUS at 04:39

## 2023-03-31 RX ADMIN — SUCRALFATE 1 G: 1 TABLET ORAL at 08:20

## 2023-03-31 RX ADMIN — COLCHICINE 0.6 MG: 0.6 TABLET, FILM COATED ORAL at 04:38

## 2023-03-31 RX ADMIN — IBUPROFEN 600 MG: 600 TABLET, FILM COATED ORAL at 04:40

## 2023-03-31 RX ADMIN — OMEPRAZOLE 20 MG: 20 CAPSULE, DELAYED RELEASE ORAL at 04:38

## 2023-03-31 RX ADMIN — DOCUSATE SODIUM 50 MG AND SENNOSIDES 8.6 MG 2 TABLET: 8.6; 5 TABLET, FILM COATED ORAL at 04:38

## 2023-03-31 RX ADMIN — LOSARTAN POTASSIUM 100 MG: 50 TABLET, FILM COATED ORAL at 04:38

## 2023-03-31 RX ADMIN — CARVEDILOL 3.12 MG: 3.12 TABLET, FILM COATED ORAL at 04:39

## 2023-03-31 RX ADMIN — AMIODARONE HYDROCHLORIDE 200 MG: 200 TABLET ORAL at 04:38

## 2023-03-31 RX ADMIN — HYDROCHLOROTHIAZIDE 25 MG: 25 TABLET ORAL at 04:39

## 2023-03-31 ASSESSMENT — ENCOUNTER SYMPTOMS
SHORTNESS OF BREATH: 0
STRIDOR: 0
CHOKING: 0
CHEST TIGHTNESS: 0
WHEEZING: 0
APNEA: 0
COUGH: 0

## 2023-03-31 ASSESSMENT — PAIN DESCRIPTION - PAIN TYPE: TYPE: ACUTE PAIN

## 2023-03-31 NOTE — CARE PLAN
The patient is Stable - Low risk of patient condition declining or worsening    Shift Goals  Clinical Goals: monitor VS, promote self care  Patient Goals: home tomorrow  Family Goals: ACOSTA    Progress made toward(s) clinical / shift goals:    Problem: Pain - Standard  Goal: Alleviation of pain or a reduction in pain to the patient’s comfort goal  Outcome: Progressing     Problem: Knowledge Deficit - Standard  Goal: Patient and family/care givers will demonstrate understanding of plan of care, disease process/condition, diagnostic tests and medications  Outcome: Progressing       Patient is not progressing towards the following goals:

## 2023-03-31 NOTE — PROGRESS NOTES
[facility] Internal Medicine Daily Progress Note    Date of Service  3/30/2023    [facility] Team: R IM Green Team   Attending: ROSEANN Womack M.d.  Senior Resident: Tatyana Hurley MD  Intern: Linus Flores DO   Contact Number: [phone]    Chief Complaint  Zac Wilkinson is a 68 y.o. male admitted 3/29/2023 with    Hospital Course  Zac Wilkinson is a 68yoM who presented 3/29/2023 with chest pain after undergoing catheter ablation for atrial fibrillation by Dr. Tejada on 3/28/2023. He is on anticoagulation with apixaban. Past medical history includes CAD s/p PCI to LAD 10 years ago, hypertension, former smoker, and type 2 diabetes mellitus. Upon arrival to the ED he was hypertensive. Troponin elevated at 981->728 and NT proBNP 728. CTA of the chest done per cardiology, Dr. Ravi, recommendation was negative for PE or pericardial effusion. He was treated with morphine, Dilaudid, fluid bolus.     Upon my examination, the patient said his chest pain had nearly resolved after receiving pain meds.  He says it started around 8AM and was substernal, burning, pleuritic, nonradiating, and similar to his GERD pain. He says throughout the day the pain progressively worsened and he develops associated nausea with nonbloody vomiting, diaphoresis, dyspnea, and  dizziness. He tried self-medicating with Tums but this did not alleviate his pain prompting him to come to the ER. He mentioned he had not been able to  his sucralfate prescription from the pharmacy yet.    Interval Problem Update  - Reports chest pain improved from 9/10 to 4/10 after receiving GI cocktail in ER; now 3/10, worsened with deep breaths. Educated about atelectasis and provided IS.   - CTPA shows no evidence of PE. Started on empiric coverage for pneumonia in setting of bibasilar opacities. Procal WNR.   - Trops downtrending. Cardiology consulted; recommendations appreciated.     I have discussed this patient's plan of care and discharge  plan at IDT rounds today with Case Management, Nursing, Nursing leadership, and other members of the IDT team.    Consultants/Specialty  cardiology    Code Status  Full Code    Disposition  Patient is not medically cleared for discharge.   Anticipate discharge to to home with close outpatient follow-up.  I have placed the appropriate orders for post-discharge needs.    Review of Systems  Review of Systems   Constitutional:  Positive for diaphoresis. Negative for chills and fever.   HENT:  Negative for congestion and sore throat.    Eyes:  Negative for blurred vision and double vision.   Respiratory:  Negative for cough, sputum production and shortness of breath.    Cardiovascular:  Positive for chest pain. Negative for palpitations, orthopnea, claudication, leg swelling and PND.   Gastrointestinal:  Positive for nausea and vomiting. Negative for blood in stool and diarrhea.   Genitourinary:  Negative for dysuria.   Musculoskeletal:  Negative for back pain, joint pain and neck pain.   Skin:  Negative for rash.   Neurological:  Positive for weakness and headaches. Negative for dizziness.   Psychiatric/Behavioral:  Negative for depression. The patient is not nervous/anxious and does not have insomnia.       Physical Exam  Temp:  [36.2 °C (97.2 °F)-36.7 °C (98.1 °F)] 36.7 °C (98.1 °F)  Pulse:  [62-72] 68  Resp:  [14-18] 18  BP: ()/(60-79) 121/64  SpO2:  [86 %-94 %] 90 %    Physical Exam    Fluids    Intake/Output Summary (Last 24 hours) at 3/30/2023 2124  Last data filed at 3/30/2023 0900  Gross per 24 hour   Intake 360 ml   Output --   Net 360 ml       Laboratory  Recent Labs     03/29/23 2133   WBC 12.7*   RBC 4.60*   HEMOGLOBIN 14.5   HEMATOCRIT 41.1*   MCV 89.3   MCH 31.5   MCHC 35.3   RDW 44.2   PLATELETCT 183   MPV 11.6     Recent Labs     03/29/23 2133   SODIUM 135   POTASSIUM 4.3   CHLORIDE 96   CO2 22   GLUCOSE 380*   BUN 33*   CREATININE 1.40   CALCIUM 9.8                   Imaging  EC-ECHOCARDIOGRAM  "LTD W/O CONT         CT-CTA CHEST WITH & W/O-POST PROCESS   Final Result         1.  No pulmonary embolus appreciated.   2.  Bilateral lower lobe infiltrates.   3.  Low-density lesion left hepatic lobe, recommend follow-up MRI of the liver with contrast for further characterization to exclude underlying hepatic mass.   4.  Atherosclerosis and atherosclerotic coronary artery disease.      DX-CHEST-PORTABLE (1 VIEW)   Final Result         1.  Bibasilar atelectasis versus early infiltrate, greatest on the left.           Assessment/Plan  Problem Representation:    * Chest pain- (present on admission)  Assessment & Plan  Suspect esophageal etiology or post-procedural tracheitis, though he may also have pericarditis.  Serial EKGs thus far nonischemic. Troponin elevated however this is expected after ablation  - GI cocktail to treat gastrointestinal pain. Continue home PPI and sucralfate.   - Per Cards, started on Colchicine for suspected pericarditis.       Liver lesion, left lobe  Assessment & Plan  CTA chest: \"Low-density lesion left hepatic lobe, recommend follow-up MRI of the liver with contrast for further characterization to exclude underlying hepatic mass.\"  Outpatient follow-up    Acute hypoxemic respiratory failure (HCC)  Assessment & Plan  Etiology: bibasilar atelectasis due to chest pain  Home O2: none  Reportedly saturating below 88% in the ED requiring 2 L supplemental oxygen  Supplemental O2  Will monitor oxygenation with continuous pulse oximetry; goal SpO2 88 to 92%  Incentive spirometry    Elevated liver enzymes  Assessment & Plan  ? Liver lesion on imaging, amiodarone, NAFLD  Denies alcohol use  No abdominal tenderness  Trend CMP    Elevated troponin  Assessment & Plan  Etiology: Likely from ablation  Serial EKG nonischemic  Serial trop downtrending  Trend troponin and EKG    Leukocytosis  Assessment & Plan  Stress demargination  Serial monitor CBC and watch fever curve, glucose control     S/P ablation " of atrial fibrillation  Assessment & Plan  Underwent catheter ablation of atrial fibrillation by Dr. Tejada on 3/28/2023.  - Per Cardiology, continued on home Amiodarone, Apixaban.       Uncontrolled type 2 diabetes mellitus with hyperglycemia (HCC)- (present on admission)  Assessment & Plan  Hold home meds   SSI while inpatient  We will monitor blood glucose checks and titrate insulin as necessary; goal sugars 140-180  Diabetic diet when able    Dyslipidemia- (present on admission)  Assessment & Plan  Statin    Primary hypertension- (present on admission)  Assessment & Plan  Losartan, hydrochlorothiazide, carvedilol    Coronary artery disease involving native coronary artery of native heart without angina pectoris- (present on admission)  Assessment & Plan  Beta-blocker, ARB, statin  On apixaban for A-fib         VTE prophylaxis: therapeutic anticoagulation with Apixaban    I have performed a physical exam and reviewed and updated ROS and Plan today (3/30/2023). In review of yesterday's note (3/29/2023), there are no changes except as documented above.

## 2023-03-31 NOTE — DOCUMENTATION QUERY
"                                                                         [facility]                                                                       Query Response Note      PATIENT:               NIMO KIRBY  ACCT #:                  [id number]  MRN:                     [id number]  :                      1955  ADMIT DATE:       3/29/2023 9:14 PM  DISCH DATE:          RESPONDING  PROVIDER #:        [id number]           QUERY TEXT:    Empiric coverage for PNA is documented in the Progress Notes. Can a clarification regarding PNA be provided?    NOTE:  If an appropriate response is not listed below, please respond with a new note.       The patient's Clinical Indicators include:  CXR on admission noted \"Bibasilar atelectasis versus early infiltrate, greatest on the left\". WBC: 12.7, Lactic Acid: 1.5, and Procalcitonin: 0.18 on admission. \"Pt saturating less than 88% on RA\" per ED Nurse Note on 3/29; most recent SpO2%: 90% on RA with RR: 20 on 3/31 at 0733. \"Empiric coverage for pneumonia in setting of bibasilar opacities\" is documented in the Progress Notes on 3/30-3/31.    Treatments include: Rocephin and O2.    Risk factors include: dx Acute Hypoxemic Respiratory Failure, dx Atelectasis, ddx Acute Pericarditis v. Acute Tracheitis 2/2 Recent Ablation, and hx DM II.    Thank you,  Tommy Knowles RN, BSN  Clinical   Connect via [email or web address]  Options provided:   -- Unspecified Bacterial PNA is ruled in   -- PNA is ruled out   -- Other explanation, Please specify   -- Unable to determine      Query created by: Tommy Knowles on 3/31/2023 9:28 AM    RESPONSE TEXT:    PNA is ruled out          Electronically signed by:  KELLY GREGG MD 3/31/2023 12:54 PM              "

## 2023-03-31 NOTE — HOSPITAL COURSE
Mr. Zac Wilkinson is a 68-year-old male with coronary artery disease (status post percutaneous coronary intervention, 2013), hypertension, type 2 diabetes mellitus, and former tobacco use admitted on 3/29/2023 for further evaluation of exertional, pleuritic chest pain, associated with nausea, vomiting, shortness of breath, and dysuria. He had undergone catheter ablation for atrial fibrillation by Dr. Sidra Tejada on 3/28/2023.     # Chest pain: Likely post-procedural pericarditis.Initial labs showed elevated troponins, as expected following catheter ablation, without interval changes on EKG. CT pulmonary angiography showed no evidence of pulmonary embolism.  Cardiology was consulted and detected pericardial friction rub on auscultation, consistent with pericarditis. Of note, ESR was also elevated. He is being sent home on a 6-week course of Colchicine.     # Leukocytosis: Likely post-procedural demargination, now resolved.   # Atrial fibrillation: He underwent catheter ablation of atrial fibrillation by Dr. Tejada on 3/28/2023. Per Cardiology, he was continued on home Amiodarone and Apixaban and will continue outpatient follow-up as scheduled.      # Acute hypoxic respiratory failure: He presented with shortness of breath, requiring 2L oxygen supplementation per nasal cannula. Likely due to atelectasis. He reported taking shallow breaths to avoid pleuritic chest pain. He was provided and sent home with inspiratory spirometer. At time of discharge, he was back on room air. Resolved.    # Reflux esophagitis: Chest pain improved with GI cocktail while in ER. He was started on low-dose Omeprazole, which was continued on discharge, and instructed to follow up with PCP in 1-2 weeks.     # Incidental finding on diagnostic imaging: CT pulmonary angiography showed a low-density lesion in the left hepatic lobe, which was conveyed to patient prior to discharge. He was advised advised to follow up with PCP for further  imaging as desired.

## 2023-03-31 NOTE — PROGRESS NOTES
Cardiology Follow Up Progress Note    Date of Service  3/31/2023    Attending Physician  ROSEANN Womack M.D.    Chief Complaint   Pleuritic chest pain    HPI  Zac Wilkinson is a 68 y.o. male with recent A-fib ablation 3/28/2023, on OAC with apixaban, hypertension, hyperlipidemia, type 2 diabetes PCI LAD 2010, LVEF 60% admitted 3/29/2023 with pleuritic chest pain.    Interim Events  No overnight cardiac events  Telemetry-SR  Symptoms improved significantly with nonsteroid anti-inflammatory therapy    Review of Systems  Review of Systems   Respiratory:  Negative for apnea, cough, choking, chest tightness, shortness of breath, wheezing and stridor.      Vital signs in last 24 hours  Temp:  [36.4 °C (97.5 °F)-36.7 °C (98.1 °F)] 36.7 °C (98.1 °F)  Pulse:  [65-70] 67  Resp:  [16-20] 20  BP: (112-138)/(63-72) 126/71  SpO2:  [90 %-94 %] 90 %    Physical Exam  Physical Exam  Cardiovascular:      Rate and Rhythm: Normal rate and regular rhythm.   Pulmonary:      Effort: Pulmonary effort is normal.   Skin:     General: Skin is warm.   Neurological:      Mental Status: He is alert. Mental status is at baseline.   Psychiatric:         Mood and Affect: Mood normal.       Lab Review  Lab Results   Component Value Date/Time    WBC 8.4 03/31/2023 01:49 AM    RBC 3.95 (L) 03/31/2023 01:49 AM    HEMOGLOBIN 12.4 (L) 03/31/2023 01:49 AM    HEMATOCRIT 36.5 (L) 03/31/2023 01:49 AM    MCV 92.4 03/31/2023 01:49 AM    MCH 31.4 03/31/2023 01:49 AM    MCHC 34.0 03/31/2023 01:49 AM    MPV 10.7 03/31/2023 01:49 AM      Lab Results   Component Value Date/Time    SODIUM 132 (L) 03/31/2023 01:49 AM    POTASSIUM 3.8 03/31/2023 01:49 AM    CHLORIDE 97 03/31/2023 01:49 AM    CO2 23 03/31/2023 01:49 AM    GLUCOSE 268 (H) 03/31/2023 01:49 AM    BUN 32 (H) 03/31/2023 01:49 AM    CREATININE 1.51 (H) 03/31/2023 01:49 AM      Lab Results   Component Value Date/Time    ASTSGOT 38 03/31/2023 01:49 AM    ALTSGPT 70 (H) 03/31/2023 01:49 AM     Lab  Results   Component Value Date/Time    TROPONINT 523 (H) 03/30/2023 09:11 AM       Recent Labs     03/29/23  2133 03/29/23  2247   NTPROBNP 728* 676*       Cardiac Imaging and Procedures Review  EKG:  My personal interpretation of the EKG dated  is     Echocardiogram:      Cardiac Catheterization:      Imaging  Chest X-Ray:       Stress Test:      Assessment/Plan    #Pericarditis post ablation procedure  #Recent A-fib ablation 3/28/2023  #On OAC with apixaban  #Type 2 diabetes    Recommendations  -Continue colchicine at discharge.  -Discontinue Motrin in the setting of CKD  -Repeat limited echo revealed normal pericardium without effusion  -Close follow-up in cardiology clinic, will arrange    Stable for discharge from cardiology standpoint.    Thank you for allowing me to participate in the care of this patient.  Cardiology will sign off on this patient    Please contact me with any questions.    PUMA Garnica.   Cardiologist, [facility]  [phone]

## 2023-03-31 NOTE — DISCHARGE INSTRUCTIONS
You were admitted for chest pain which developed after undergoing ablation for atrial fibrillation. We suspect this is due to a condition called pericarditis, where the protective sac around the heart becomes inflammed.     We are sending you home with a new medication, Colchicine. Please take as prescribed by the pharmacist.   Follow up with Cardiology within the next 3-4 weeks.   We found a small lesion in the left lobe of your liver. We highly recommend you follow up with Gastroenterology for further characterization.

## 2023-04-01 NOTE — DISCHARGE SUMMARY
[facility] Discharge Summary    Attending: ROSEANN Womack MD  Senior Resident: Tatyana Hurley MD  Intern: Linus Flores DO  Contact Number: [phone]    CHIEF COMPLAINT ON ADMISSION  Chief Complaint   Patient presents with    Chest Pain     Patient reports mid chest pain with SOB,  N/V and feeling diaphoretic since 1500. Patient reports having an ablation yesterday with Dr. Tejada.      Reason for Admission  Chest Pain     Admission Date  3/29/2023    CODE STATUS  Full     HPI & HOSPITAL COURSE  Mr. Zac Wilkinson is a 68-year-old male with coronary artery disease (status post percutaneous coronary intervention, 2013), hypertension, type 2 diabetes mellitus, and former tobacco use admitted on 3/29/2023 for further evaluation of exertional, pleuritic chest pain, associated with nausea, vomiting, shortness of breath, and dysuria. He had undergone catheter ablation for atrial fibrillation by Dr. Sidra Tejada on 3/28/2023.     # Chest pain: Likely post-procedural pericarditis.Initial labs showed elevated troponins, as expected following catheter ablation, without interval changes on EKG. CT pulmonary angiography showed no evidence of pulmonary embolism.  Cardiology was consulted and detected pericardial friction rub on auscultation, consistent with pericarditis. Of note, ESR was also elevated. He is being sent home on a 6-week course of Colchicine.     # Leukocytosis: Likely post-procedural demargination.  # Atrial fibrillation: He underwent catheter ablation of atrial fibrillation by Dr. Tejada on 3/28/2023. Per Cardiology, he was continued on home Amiodarone and Apixaban and will continue outpatient follow-up as scheduled.      # Acute hypoxic respiratory failure: He presented with shortness of breath, requiring 2L oxygen supplementation per nasal cannula. Likely due to atelectasis. He reported taking shallow breaths to avoid pleuritic chest pain. He was provided and sent home with inspiratory  spirometer. At time of discharge, he was back on room air. Resolved.    # Reflux esophagitis: Chest pain improved with GI cocktail while in ER. He was started on low-dose Omeprazole, which was continued on discharge, and instructed to follow up with PCP in 1-2 weeks.     # Incidental finding on diagnostic imaging: CT pulmonary angiography showed a low-density lesion in the left hepatic lobe, which was conveyed to patient prior to discharge. He was advised advised to follow up with PCP for further imaging as desired.    Therefore, he is discharged in good and stable condition to home with close outpatient follow-up.    The patient recovered much more quickly than anticipated on admission.    Discharge Date  3/31/2023    Physical Exam on Day of Discharge  Physical Exam  Constitutional:       General: He is not in acute distress.  HENT:      Head: Normocephalic and atraumatic.      Right Ear: External ear normal.      Left Ear: External ear normal.      Mouth/Throat:      Mouth: Mucous membranes are moist.   Eyes:      General: No scleral icterus.     Conjunctiva/sclera: Conjunctivae normal.   Cardiovascular:      Rate and Rhythm: Normal rate and regular rhythm.      Heart sounds: No murmur heard.  Pulmonary:      Effort: Pulmonary effort is normal. No respiratory distress.      Breath sounds: No wheezing or rales.   Abdominal:      Palpations: Abdomen is soft.      Tenderness: There is no abdominal tenderness.   Genitourinary:     Comments: Right groin ablation entry site nonedematous with bandage c/d/i  Musculoskeletal:      Right lower leg: No edema.      Left lower leg: No edema.   Skin:     General: Skin is warm and dry.   Neurological:      Mental Status: He is alert and oriented to person, place, and time.      Coordination: Coordination normal.   Psychiatric:         Behavior: Behavior normal.         Thought Content: Thought content normal.     FOLLOW UP ITEMS POST DISCHARGE  You were admitted for chest pain  which developed after undergoing ablation for atrial fibrillation. We suspect this is due to a condition called pericarditis, where the protective sac around the heart becomes inflammed.     We are sending you home with a new medication, Colchicine. Please take as prescribed by the pharmacist.   Follow up with Cardiology within the next 3-4 weeks.   We found a small lesion in the left lobe of your liver. We highly recommend you follow up with Gastroenterology for further characterization.     DISCHARGE DIAGNOSES  Principal Problem (Resolved):    Chest pain POA: Yes  Active Problems:    Coronary artery disease involving native coronary artery of native heart without angina pectoris POA: Yes    Primary hypertension POA: Yes    Dyslipidemia POA: Yes    S/P ablation of atrial fibrillation POA: Unknown    Elevated troponin POA: Yes    Elevated liver enzymes POA: Unknown    Liver lesion, left lobe POA: Unknown  Resolved Problems:    Uncontrolled type 2 diabetes mellitus with hyperglycemia (HCC) POA: Yes    Leukocytosis POA: Unknown    Acute hypoxemic respiratory failure (HCC) POA: Unknown    FOLLOW UP  Future Appointments   Date Time Provider Department Center   4/19/2023 11:15 AM JESSY Hernandez [facility] None     Shanae Monae P.AEstefany-C.  [id number] [facility] [id number]  [phone]          MEDICATIONS ON DISCHARGE     Medication List        START taking these medications        Instructions   atorvastatin 40 MG Tabs  Commonly known as: LIPITOR   Take 1 Tablet by mouth every evening.  Dose: 40 mg     colchicine 0.6 MG Tabs  Commonly known as: COLCRYS   Take 1 Tablet by mouth every day.  Dose: 0.6 mg            CONTINUE taking these medications        Instructions   amiodarone 200 MG Tabs  Commonly known as: Cordarone   Take 1 Tablet by mouth every day.  Dose: 200 mg     carvedilol 3.125 MG Tabs  Commonly known as: COREG   Take 3.125 mg by mouth 2 times a day.  Dose: 3.125 mg     Eliquis 5mg  Tabs  Generic drug: apixaban   Take 1 Tablet by mouth 2 times a day. Indications: Thromboembolism secondary to Atrial Fibrillation  Dose: 5 mg     losartan 100 MG Tabs  Commonly known as: COZAAR   Take 100 mg by mouth every day.  Dose: 100 mg     metformin 1000 MG tablet  Commonly known as: GLUCOPHAGE   Take 1 Tablet by mouth every day.  Dose: 1 Tablet     omeprazole 20 MG delayed-release capsule  Commonly known as: PRILOSEC   Take 1 Capsule by mouth every day.  Dose: 20 mg     sucralfate 1 GM Tabs  Commonly known as: Carafate   Take 1 Tablet by mouth 4 Times a Day,Before Meals and at Bedtime for 14 days.  Dose: 1 g            STOP taking these medications      hydroCHLOROthiazide 25 MG Tabs  Commonly known as: HYDRODIURIL     simvastatin 80 MG tablet  Commonly known as: ZOCOR              Allergies  Allergies   Allergen Reactions    Lisinopril Cough     DIET  Diabetic diet    ACTIVITY  As tolerated.  Weight bearing as tolerated    CONSULTATIONS  Cardiology    PROCEDURES  N/A    LABORATORY  Lab Results   Component Value Date    SODIUM 132 (L) 03/31/2023    POTASSIUM 3.8 03/31/2023    CHLORIDE 97 03/31/2023    CO2 23 03/31/2023    GLUCOSE 268 (H) 03/31/2023    BUN 32 (H) 03/31/2023    CREATININE 1.51 (H) 03/31/2023        Lab Results   Component Value Date    WBC 8.4 03/31/2023    HEMOGLOBIN 12.4 (L) 03/31/2023    HEMATOCRIT 36.5 (L) 03/31/2023    PLATELETCT 194 03/31/2023      Total time of the discharge process exceeds 45 minutes.   0

## 2023-04-06 ENCOUNTER — TELEPHONE (OUTPATIENT)
Dept: CARDIOLOGY | Facility: MEDICAL CENTER | Age: 68
End: 2023-04-06
Payer: MEDICARE

## 2023-04-06 NOTE — TELEPHONE ENCOUNTER
"    Caller: Zac Wilkinson    Topic/issue: MEDICATION MANAGEMENT    Patient states that when he was discharged from the hospital he was instructed to stop some of his medications:    SIMVASTATIN  HCTZ    Patient states that since stopping these medications he has been feeling \"off\". Patient would like to know if he should restart these meds or try something else. Please advise.    Thank you,  Kareem SCHOFIELD    Callback Number: [phone] (home)       "

## 2023-04-10 NOTE — TELEPHONE ENCOUNTER
Pt was unaware that medications were changed and was taking hctz and simva in addition to new med. Pt has been on day 4 of taking correct medication and start     Nausea, diarrhea, vomiting. States slowly improving. Possibly r/t Carafate, pt will continue meds and notify us if has not improved.

## 2023-04-19 ENCOUNTER — APPOINTMENT (OUTPATIENT)
Dept: CARDIOLOGY | Facility: MEDICAL CENTER | Age: 68
End: 2023-04-19
Payer: MEDICARE

## 2023-05-01 DIAGNOSIS — I48.91 ATRIAL FIBRILLATION, NEW ONSET (HCC): ICD-10-CM

## 2023-05-01 RX ORDER — OMEPRAZOLE 20 MG/1
20 CAPSULE, DELAYED RELEASE ORAL DAILY
Qty: 30 CAPSULE | Refills: 0 | OUTPATIENT
Start: 2023-05-01

## 2023-05-31 LAB — EKG IMPRESSION: NORMAL

## 2023-06-08 ENCOUNTER — TELEPHONE (OUTPATIENT)
Dept: CARDIOLOGY | Facility: MEDICAL CENTER | Age: 68
End: 2023-06-08
Payer: MEDICARE

## 2023-06-08 DIAGNOSIS — I48.91 ATRIAL FIBRILLATION, NEW ONSET (HCC): ICD-10-CM

## 2023-06-08 RX ORDER — AMIODARONE HYDROCHLORIDE 200 MG/1
200 TABLET ORAL DAILY
Qty: 90 TABLET | Refills: 0 | Status: SHIPPED | OUTPATIENT
Start: 2023-06-08

## 2023-06-08 NOTE — TELEPHONE ENCOUNTER
Pt wondering about meds to continue, reviewed current med list. Refilled amiodarone per pt request and scheduled f/u visit.

## 2023-06-08 NOTE — TELEPHONE ENCOUNTER
SS    Caller: Zac Wilkinson    Topic/issue: Above PT requesting a call back in regards to his medication regiment, would like clarification on what he is to be taking.     Callback Number: [phone]    Thank You  Mitra MENDOZA

## 2023-06-20 ENCOUNTER — OFFICE VISIT (OUTPATIENT)
Dept: CARDIOLOGY | Facility: MEDICAL CENTER | Age: 68
End: 2023-06-20
Attending: INTERNAL MEDICINE
Payer: MEDICARE

## 2023-06-20 VITALS
DIASTOLIC BLOOD PRESSURE: 68 MMHG | BODY MASS INDEX: 26.04 KG/M2 | HEIGHT: 71 IN | OXYGEN SATURATION: 91 % | RESPIRATION RATE: 16 BRPM | SYSTOLIC BLOOD PRESSURE: 124 MMHG | HEART RATE: 66 BPM | WEIGHT: 186 LBS

## 2023-06-20 DIAGNOSIS — I10 PRIMARY HYPERTENSION: ICD-10-CM

## 2023-06-20 DIAGNOSIS — Z79.899 LONG TERM CURRENT USE OF AMIODARONE: ICD-10-CM

## 2023-06-20 DIAGNOSIS — I48.91 ATRIAL FIBRILLATION, NEW ONSET (HCC): ICD-10-CM

## 2023-06-20 PROCEDURE — 3078F DIAST BP <80 MM HG: CPT | Performed by: INTERNAL MEDICINE

## 2023-06-20 PROCEDURE — 93005 ELECTROCARDIOGRAM TRACING: CPT | Performed by: INTERNAL MEDICINE

## 2023-06-20 PROCEDURE — 99212 OFFICE O/P EST SF 10 MIN: CPT | Performed by: INTERNAL MEDICINE

## 2023-06-20 PROCEDURE — 3074F SYST BP LT 130 MM HG: CPT | Performed by: INTERNAL MEDICINE

## 2023-06-20 PROCEDURE — 99214 OFFICE O/P EST MOD 30 MIN: CPT | Performed by: INTERNAL MEDICINE

## 2023-06-20 PROCEDURE — 93010 ELECTROCARDIOGRAM REPORT: CPT | Performed by: INTERNAL MEDICINE

## 2023-06-20 RX ORDER — EMPAGLIFLOZIN 10 MG/1
TABLET, FILM COATED ORAL
COMMUNITY
Start: 2023-05-23

## 2023-06-20 ASSESSMENT — FIBROSIS 4 INDEX: FIB4 SCORE: 1.59

## 2023-06-20 NOTE — PROGRESS NOTES
Arrhythmia Clinic Note (Established patient)    DOS: 2023    Chief complaint/Reason for consult: F/u AF ablation    Interval History:  Pt is a 67 yo M. History of AF/AFL s/p PV isolation + flutter line with me. Post operatively with some pericarditis. Was actually admitted for this. Started on colchicine. Had diarrhea with this. In any case, finished course of antiinflammatories and now feeling fine. Continues to be on amio.    ROS (+ highlighted in red):  General--Negative for fatigue, weight loss or weight gain  Cardiovascular--Negative for CP, orthopnea, PND    Past Medical History:   Diagnosis Date    Arrhythmia     Dental disorder     dentures    Diabetes (HCC)     borderline    Myocardial infarct (HCC)        Past Surgical History:   Procedure Laterality Date    STENT PLACEMENT      HERNIA REPAIR         Social History     Socioeconomic History    Marital status:      Spouse name: Not on file    Number of children: Not on file    Years of education: Not on file    Highest education level: Not on file   Occupational History    Not on file   Tobacco Use    Smoking status: Former     Types: Cigarettes     Quit date:      Years since quittin.5    Smokeless tobacco: Never   Vaping Use    Vaping Use: Never used   Substance and Sexual Activity    Alcohol use: Not Currently    Drug use: Yes     Types: Inhaled     Comment: marijuana    Sexual activity: Not on file   Other Topics Concern    Not on file   Social History Narrative    Not on file     Social Determinants of Health     Financial Resource Strain: Not on file   Food Insecurity: Not on file   Transportation Needs: Not on file   Physical Activity: Not on file   Stress: Not on file   Social Connections: Not on file   Intimate Partner Violence: Not on file   Housing Stability: Not on file       History reviewed. No pertinent family history.    Allergies   Allergen Reactions    Lisinopril Cough       Current Outpatient Medications  "  Medication Sig Dispense Refill    JARDIANCE 10 MG Tab tablet Take 1 Tablet (10 mg) by mouth daily in the morning.      amiodarone (CORDARONE) 200 MG Tab Take 1 Tablet by mouth every day. 90 Tablet 0    atorvastatin (LIPITOR) 40 MG Tab Take 1 Tablet by mouth every evening. 30 Tablet 11    omeprazole (PRILOSEC) 20 MG delayed-release capsule Take 1 Capsule by mouth every day. 30 Capsule 0    metformin (GLUCOPHAGE) 1000 MG tablet Take 1 Tablet by mouth every day.      losartan (COZAAR) 100 MG Tab Take 100 mg by mouth every day.      carvedilol (COREG) 3.125 MG Tab Take 3.125 mg by mouth 2 times a day.      apixaban (ELIQUIS) 5mg Tab Take 1 Tablet by mouth 2 times a day. Indications: Thromboembolism secondary to Atrial Fibrillation 60 Tablet 0    colchicine (COLCRYS) 0.6 MG Tab Take 1 Tablet by mouth every day. (Patient not taking: Reported on 6/20/2023) 30 Tablet 0     No current facility-administered medications for this visit.       Physical Exam:  Vitals:    06/20/23 1416   BP: 124/68   BP Location: Left arm   Patient Position: Sitting   BP Cuff Size: Adult   Pulse: 66   Resp: 16   SpO2: 91%   Weight: 84.4 kg (186 lb)   Height: 1.803 m (5' 11\")     General appearance: NAD, conversant  HEENT: PERRL, neck is supple with FROM  Lungs: Clear to auscultation, normal respiratory effort  CV: RRR, no murmurs/rubs/gallops, no JVD  Abdomen: Soft, non-tender with normal bowel sounds  Extremities: No peripheral edema, no clubbing or cyanosis  Skin: No rash, lesions, or ulcers  Psych: Alert and oriented to person, place and time    Data:  Labs reviewed    Prior echo/stress reviewed:  LVEF 60%    EKG interpreted by me:  NSR    Impression/Plan:  1. Atrial fibrillation, new onset (HCC)  EKG      2. Primary hypertension        3. Long term current use of amiodarone          -Stop amiodarone  -Continue OAC  -F/u in 6 mo, sooner if recurrence of atrial arrhythmia off antiarrhythmics    Sidra Tejada MD    "

## 2023-08-21 LAB — EKG IMPRESSION: NORMAL

## 2024-06-28 ENCOUNTER — OFFICE VISIT (OUTPATIENT)
Dept: CARDIOLOGY | Facility: MEDICAL CENTER | Age: 69
End: 2024-06-28
Attending: INTERNAL MEDICINE
Payer: MEDICARE

## 2024-06-28 VITALS
WEIGHT: 185 LBS | SYSTOLIC BLOOD PRESSURE: 130 MMHG | RESPIRATION RATE: 16 BRPM | OXYGEN SATURATION: 95 % | HEART RATE: 74 BPM | BODY MASS INDEX: 25.9 KG/M2 | HEIGHT: 71 IN | DIASTOLIC BLOOD PRESSURE: 74 MMHG

## 2024-06-28 DIAGNOSIS — I48.91 ATRIAL FIBRILLATION, NEW ONSET (HCC): ICD-10-CM

## 2024-06-28 LAB — EKG IMPRESSION: NORMAL

## 2024-06-28 PROCEDURE — 93005 ELECTROCARDIOGRAM TRACING: CPT | Performed by: INTERNAL MEDICINE

## 2024-06-28 PROCEDURE — 99212 OFFICE O/P EST SF 10 MIN: CPT | Performed by: INTERNAL MEDICINE

## 2024-06-28 ASSESSMENT — FIBROSIS 4 INDEX: FIB4 SCORE: 1.62

## 2024-06-28 NOTE — PROGRESS NOTES
Arrhythmia Clinic Note (Established patient)    DOS: 2024    Chief complaint/Reason for consult: F/u AF/AFL ablation    Interval History:  Pt is a 70 yo M. History of T2DM, MI s/p prior PCI, AF/AFL s/p PV isolation and RA flutter ablation with us. Here for follow-up. Off amio since last visit. Remains in sinus rhythm. Better exertion now in sinus rhythm. Trying to cut sugar. No complaints today.    ROS (+ highlighted in red):  General--Negative for fatigue, weight loss or weight gain  Cardiovascular--Negative for CP, orthopnea, PND    Past Medical History:   Diagnosis Date    Arrhythmia     Dental disorder     dentures    Diabetes (HCC)     borderline    Myocardial infarct (HCC)        Past Surgical History:   Procedure Laterality Date    STENT PLACEMENT  2013    HERNIA REPAIR         Social History     Socioeconomic History    Marital status:      Spouse name: Not on file    Number of children: Not on file    Years of education: Not on file    Highest education level: Not on file   Occupational History    Not on file   Tobacco Use    Smoking status: Former     Current packs/day: 0.00     Types: Cigarettes     Quit date: 1968     Years since quittin.5    Smokeless tobacco: Never   Vaping Use    Vaping status: Never Used   Substance and Sexual Activity    Alcohol use: Not Currently    Drug use: Yes     Types: Inhaled     Comment: marijuana    Sexual activity: Not on file   Other Topics Concern    Not on file   Social History Narrative    Not on file     Social Determinants of Health     Financial Resource Strain: Not on File (2019)    Received from [facility]     Financial Resource Strain     Financial Resource Strain: 0   Food Insecurity: No Food Insecurity (2023)    Received from [employer] (and [facility], [place], and [place] prior to 2021)    Food Insecurity     Social/Environmental Concerns: No concerns   Transportation Needs: No Transportation Needs  (4/11/2023)    Received from [facility] (and [facility] prior to 7/1/2021)    Transportation Needs     Social/Environmental Concerns: No concerns   Physical Activity: Not on File (8/24/2019)    Received from [facility]     Physical Activity     Physical Activity: 0   Stress: Not on File (8/24/2019)    Received from [employer]     Stress     Stress: 0   Social Connections: Not on File (8/24/2019)    Received from [facility]     Social Connections     Social Connections and Isolation: 0   Intimate Partner Violence: Not At Risk (4/11/2023)    Received from [facility] (and [facility] prior to 7/1/2021)    Intimate Partner Violence      Are you in a relationship with someone who hurts you emotionally and/or physically?: No   Housing Stability: Low Risk  (4/11/2023)    Received from [facility] (and [facility] prior to 7/1/2021)    Housing Stability     Social/Environmental Concerns: No concerns       History reviewed. No pertinent family history.    Allergies   Allergen Reactions    Lisinopril Cough       Current Outpatient Medications   Medication Sig Dispense Refill    JARDIANCE 10 MG Tab tablet Take 1 Tablet (10 mg) by mouth daily in the morning.      colchicine (COLCRYS) 0.6 MG Tab Take 1 Tablet by mouth every day. 30 Tablet 0    atorvastatin (LIPITOR) 40 MG Tab Take 1 Tablet by mouth every evening. 30 Tablet 11    omeprazole (PRILOSEC) 20 MG delayed-release capsule Take 1 Capsule by mouth every day. 30 Capsule 0    metformin (GLUCOPHAGE) 1000 MG tablet Take 1 Tablet by mouth every day.      losartan (COZAAR) 100 MG Tab Take 100 mg by mouth every day.      carvedilol (COREG) 3.125 MG Tab Take 3.125 mg by mouth 2 times a day.      apixaban (ELIQUIS) 5mg Tab Take 1 Tablet by mouth 2 times a day. Indications: Thromboembolism secondary to Atrial Fibrillation 60 Tablet 0     No current  "facility-administered medications for this visit.       Physical Exam:  Vitals:    06/28/24 1029   BP: 130/74   BP Location: Left arm   Patient Position: Sitting   BP Cuff Size: Adult   Pulse: 74   Resp: 16   SpO2: 95%   Weight: 83.9 kg (185 lb)   Height: 1.803 m (5' 11\")     General appearance: NAD, conversant  HEENT: PERRL, neck is supple with FROM  Lungs: Clear to auscultation, normal respiratory effort  CV: RRR, no murmurs/rubs/gallops, no JVD  Abdomen: Soft, non-tender with normal bowel sounds  Extremities: No peripheral edema, no clubbing or cyanosis  Skin: No rash, lesions, or ulcers  Psych: Alert and oriented to person, place and time    Data:  Labs reviewed    Prior echo/stress reviewed:  LVEF 60%    EKG interpreted by me:  Sinus, PVCs    Impression/Plan:  1. Atrial fibrillation, new onset (HCC)  EKG        -Doing well maintaining sinus off amio  -CHADSVasc elevated, only PVs isolated, prudent to stay on OAC if tolerating  -F/u in 12 mo with ZACHERY Tejada MD    "

## 2024-08-09 LAB — EKG IMPRESSION: NORMAL

## 2024-09-19 ENCOUNTER — OFFICE VISIT (OUTPATIENT)
Dept: URGENT CARE | Facility: PHYSICIAN GROUP | Age: 69
End: 2024-09-19
Payer: MEDICARE

## 2024-09-19 VITALS
HEART RATE: 104 BPM | BODY MASS INDEX: 25.03 KG/M2 | TEMPERATURE: 97.5 F | OXYGEN SATURATION: 96 % | WEIGHT: 178.8 LBS | RESPIRATION RATE: 16 BRPM | SYSTOLIC BLOOD PRESSURE: 170 MMHG | DIASTOLIC BLOOD PRESSURE: 98 MMHG | HEIGHT: 71 IN

## 2024-09-19 DIAGNOSIS — B35.6 TINEA CRURIS: ICD-10-CM

## 2024-09-19 DIAGNOSIS — L02.91 ABSCESS: ICD-10-CM

## 2024-09-19 PROCEDURE — 3077F SYST BP >= 140 MM HG: CPT | Performed by: STUDENT IN AN ORGANIZED HEALTH CARE EDUCATION/TRAINING PROGRAM

## 2024-09-19 PROCEDURE — 3080F DIAST BP >= 90 MM HG: CPT | Performed by: STUDENT IN AN ORGANIZED HEALTH CARE EDUCATION/TRAINING PROGRAM

## 2024-09-19 PROCEDURE — 1125F AMNT PAIN NOTED PAIN PRSNT: CPT | Performed by: STUDENT IN AN ORGANIZED HEALTH CARE EDUCATION/TRAINING PROGRAM

## 2024-09-19 PROCEDURE — 10060 I&D ABSCESS SIMPLE/SINGLE: CPT | Performed by: STUDENT IN AN ORGANIZED HEALTH CARE EDUCATION/TRAINING PROGRAM

## 2024-09-19 RX ORDER — DOXYCYCLINE HYCLATE 100 MG
100 TABLET ORAL 2 TIMES DAILY
Qty: 14 TABLET | Refills: 0 | Status: SHIPPED | OUTPATIENT
Start: 2024-09-19 | End: 2024-09-26

## 2024-09-19 RX ORDER — DOXYCYCLINE HYCLATE 100 MG
100 TABLET ORAL 2 TIMES DAILY
Qty: 14 TABLET | Refills: 0 | Status: SHIPPED | OUTPATIENT
Start: 2024-09-19 | End: 2024-09-19

## 2024-09-19 RX ORDER — KETOCONAZOLE 20 MG/G
CREAM TOPICAL
Qty: 60 G | Refills: 0 | Status: SHIPPED | OUTPATIENT
Start: 2024-09-19

## 2024-09-19 RX ORDER — KETOCONAZOLE 20 MG/G
CREAM TOPICAL
Qty: 60 G | Refills: 0 | Status: SHIPPED | OUTPATIENT
Start: 2024-09-19 | End: 2024-09-19

## 2024-09-19 ASSESSMENT — FIBROSIS 4 INDEX: FIB4 SCORE: 1.62

## 2024-09-19 ASSESSMENT — PAIN SCALES - GENERAL: PAINLEVEL: 8=MODERATE-SEVERE PAIN

## 2024-09-19 NOTE — PROGRESS NOTES
"Subjective:     CC:   Chief Complaint   Patient presents with    Breast Mass     Between buttock and inner upper thigh towards groin area x 5 day           HPI:   Zac presents today with    #Perineal Abscess   Patient reports for the past 4 to 5 days he has noticed an increased swelling between his rectum and scrotum which she thinks is a boil.  He states he may have been scratching his butt and noticed there was a painful red warm bump forming.  He tried to drain it himself a couple of days ago but was unsuccessful and subsequently it became even bigger.  He denies any fever, chills, nausea, vomiting, abdominal pain.  He has never had 1 of these before.    He reports good adherence with his medications including his diabetes medications and Eliquis.    ROS: See HPI.     Objective:     Exam:  BP (!) 170/98 (BP Location: Right arm, Patient Position: Sitting, BP Cuff Size: Adult) Comment: provider made aware no symtoms presented  Pulse (!) 104   Temp 36.4 °C (97.5 °F) (Temporal)   Resp 16   Ht 1.803 m (5' 11\")   Wt 81.1 kg (178 lb 12.8 oz)   SpO2 96%   BMI 24.94 kg/m²  Body mass index is 24.94 kg/m².    Physical Exam:  General: Pt resting in NAD, cooperative   Skin:  No cyanosis or jaundice.  Approximately 4 inch x 2 inch erythematous fluctuant ruborous mass in the perineum without any evidence of necrosis.  Erythematous rash sparing skin fold with satellite lesions around the gluteal folds  HEENT: NC/AT. EOMI. No conjunctival injection or sclera icterus.   Lungs:  CTAB, good air movement. Non-labored.   Cardiovascular:  S1/S2 tachycardic, regular rhythm  Abdomen:  Abdomen is soft, non-tender, non-distended, +BS  Extremities:  No LE edema   CNS:  No gross focal neurologic deficits  Psych: Appropriate mood and affect     Labs: Reviewed    Assessment & Plan:     69 y.o. male with the following -     #Perineal Abscess  Patient with perineal abscess.  There is no evidence of necrosis or Ozzy's.  Discussed " with patient that he is at high risk of severe infection.  Imaging and labs deferred given patient clinical appearance and no evidence of necrotic infection.  Discussed options for evaluation and treatment.  Discussed risk and benefits of incision and drainage.  Patient desires to proceed with incision and drainage.  - Patient agreeable to I&D.  Please see procedure note for more detail  -Given patient's increased risk of severe infection Augmentin and doxycycline sent to pharmacy.  Patient instructed on use  - Very strict ED and return precautions given due to his increased risk with diabetes and heart disease of severe infection    #Tinea Cruris   -Ketoconazole cream sent to pharmacy patient instructed on use

## 2024-09-19 NOTE — PROCEDURES
Patient consented on procedure.  The area was cleansed with alcohol swab and Betadine.  The area was infiltrated with 10 cc of 2% lidocaine with epinephrine.  Adequate analgesia was confirmed.  A 15 blade was used to make a centimeter incision and purulent fluid was expressed.  Forceps were used to break up loculations and more purulent fluid was expressed.  Several saline flushes were used after successful drainage of abscess.  Hemostasis was achieved and topical antibiotic ointment applied with nonstick gauze and Tegaderm.  Patient was instructed on proper aftercare.  Patient tolerated procedure well